# Patient Record
Sex: FEMALE | Race: WHITE | NOT HISPANIC OR LATINO | Employment: UNEMPLOYED | URBAN - METROPOLITAN AREA
[De-identification: names, ages, dates, MRNs, and addresses within clinical notes are randomized per-mention and may not be internally consistent; named-entity substitution may affect disease eponyms.]

---

## 2017-03-17 ENCOUNTER — HOSPITAL ENCOUNTER (OUTPATIENT)
Dept: RADIOLOGY | Facility: HOSPITAL | Age: 52
Discharge: HOME | End: 2017-03-17
Attending: NEUROLOGICAL SURGERY
Payer: COMMERCIAL

## 2017-03-17 DIAGNOSIS — M50.11 CERVICAL DISC DISORDER WITH RADICULOPATHY, HIGH CERVICAL REGION: ICD-10-CM

## 2017-03-17 DIAGNOSIS — M48.02 CERVICAL SPINAL STENOSIS: ICD-10-CM

## 2017-03-17 PROCEDURE — 72141 MRI NECK SPINE W/O DYE: CPT | Mod: TC,PO

## 2017-03-17 PROCEDURE — 72050 X-RAY EXAM NECK SPINE 4/5VWS: CPT | Mod: TC,PO

## 2017-04-13 ENCOUNTER — HOSPITAL ENCOUNTER (OUTPATIENT)
Dept: RADIOLOGY | Facility: HOSPITAL | Age: 52
Discharge: HOME | End: 2017-04-13
Attending: NEUROLOGICAL SURGERY
Payer: COMMERCIAL

## 2017-04-13 DIAGNOSIS — M50.11 CERVICAL DISC DISORDER WITH RADICULOPATHY OF OCCIPITO-ATLANTO-AXIAL REGION: ICD-10-CM

## 2017-04-13 DIAGNOSIS — M48.02 SPINAL STENOSIS IN CERVICAL REGION: ICD-10-CM

## 2017-04-13 PROCEDURE — 70551 MRI BRAIN STEM W/O DYE: CPT | Mod: TC,PO

## 2017-04-20 ENCOUNTER — HOSPITAL ENCOUNTER (EMERGENCY)
Facility: HOSPITAL | Age: 52
Discharge: HOME | End: 2017-04-20
Attending: EMERGENCY MEDICINE
Payer: COMMERCIAL

## 2017-04-20 VITALS
TEMPERATURE: 98 F | BODY MASS INDEX: 33.74 KG/M2 | WEIGHT: 150 LBS | OXYGEN SATURATION: 98 % | SYSTOLIC BLOOD PRESSURE: 111 MMHG | DIASTOLIC BLOOD PRESSURE: 72 MMHG | HEIGHT: 56 IN | RESPIRATION RATE: 16 BRPM | HEART RATE: 118 BPM

## 2017-04-20 DIAGNOSIS — M54.12 CERVICAL RADICULOPATHY: Primary | ICD-10-CM

## 2017-04-20 DIAGNOSIS — M54.2 NECK PAIN: ICD-10-CM

## 2017-04-20 PROCEDURE — 99283 EMERGENCY DEPT VISIT LOW MDM: CPT | Mod: 25

## 2017-04-20 PROCEDURE — 96372 THER/PROPH/DIAG INJ SC/IM: CPT

## 2017-04-20 PROCEDURE — 63600175 PHARM REV CODE 636 W HCPCS: Performed by: PHYSICIAN ASSISTANT

## 2017-04-20 PROCEDURE — 99284 EMERGENCY DEPT VISIT MOD MDM: CPT | Mod: ,,, | Performed by: EMERGENCY MEDICINE

## 2017-04-20 RX ORDER — TRIAMCINOLONE ACETONIDE 40 MG/ML
INJECTION, SUSPENSION INTRA-ARTICULAR; INTRAMUSCULAR
Status: DISPENSED
Start: 2017-04-20 | End: 2017-04-21

## 2017-04-20 RX ORDER — ORPHENADRINE CITRATE 30 MG/ML
30 INJECTION INTRAMUSCULAR; INTRAVENOUS
Status: COMPLETED | OUTPATIENT
Start: 2017-04-20 | End: 2017-04-20

## 2017-04-20 RX ORDER — TRIAMCINOLONE ACETONIDE 40 MG/ML
80 INJECTION, SUSPENSION INTRA-ARTICULAR; INTRAMUSCULAR
Status: COMPLETED | OUTPATIENT
Start: 2017-04-20 | End: 2017-04-20

## 2017-04-20 RX ORDER — ORPHENADRINE CITRATE 30 MG/ML
INJECTION INTRAMUSCULAR; INTRAVENOUS
Status: DISPENSED
Start: 2017-04-20 | End: 2017-04-21

## 2017-04-20 RX ADMIN — ORPHENADRINE CITRATE 30 MG: 30 INJECTION INTRAMUSCULAR; INTRAVENOUS at 03:04

## 2017-04-20 RX ADMIN — TRIAMCINOLONE ACETONIDE 80 MG: 40 INJECTION, SUSPENSION INTRA-ARTICULAR; INTRAMUSCULAR at 03:04

## 2017-04-20 NOTE — DISCHARGE INSTRUCTIONS
Continue to take your Neurontin as prescribed.  Continue take Percocet for pain as needed.  Follow-up with neurosurgery.  Return to the emergency department for any worsening symptoms.      Understanding Cervical Radiculopathy    Cervical radiculopathy is irritation or inflammation of a nerve root in the neck. It causes neck pain and other symptoms that may spread into the chest or down the arm. To understand this condition, it helps to understand the parts of the spine:  · Vertebrae. These are bones that stack to form the spine. The cervical spine contains the 7 vertebrae in the neck.  · Disks. These are soft pads of tissue between the vertebrae. They act as shock absorbers for the spine.  · The spinal canal. This is a tunnel formed within the stacked vertebrae. The spinal cord runs through this canal.  · Nerves. These branch off the spinal cord. As they leave the spinal canal, nerves pass through openings between the vertebrae. The nerve root is the part of the nerve that is closest to the spinal cord.   With cervical radiculopathy, nerve roots in the neck become irritated. This leads to pain and symptoms that can travel to the nerves that go from the spinal cord down the arms and into the torso.  What causes cervical radiculopathy?  Aging, injury, poor posture, and other issues can lead to problems in the neck. These problems may then irritate nerve roots. These include:  · Damage to a disk in the cervical spine. The damaged disk may then press on nearby nerve roots.  · Degeneration from wear and tear, and aging. This can lead to narrowing (stenosis) of the openings between the vertebrae. The narrowed openings press on nerve roots as they leave the spinal canal.  · An unstable spine. This is when a vertebra slips forward. It can then press on a nerve root.  There are other, less common causes of pressure on nerves in the neck. These include infection, cysts, and tumors.  Symptoms of cervical radiculopathy  These  include:  · Neck pain  · Pain, numbness, tingling, or weakness that travels down the arm  · Loss of neck movement  · Muscle spasms  Treatment for cervical radiculopathy  In most cases, your healthcare provider will first try treatments that help relieve symptoms. These may include:  · Prescription or over-the-counter pain medicines. These help relieve pain and swelling.  · Cold packs. These help reduce pain.  · Resting. This involves avoiding positions and activities that increase pain.  · Neck brace (cervical collar). This can help relieve inflammation and pain.  · Physical therapy, including exercises and stretches. This can help decrease pain and increase movement and function.  · Shots of medicinesaround the nerve roots. This is done to help relieve symptoms for a time.  In some cases, your healthcare provider may advise surgery to fix the underlying problem. This depends on the cause, the symptoms, and how long the pain has lasted.  Possible complications  Over time, an irritated and inflamed nerve may become damaged. This may lead to long-lasting (permanent) numbness or weakness. If symptoms change suddenly or get worse, be sure to let your healthcare provider know.     When to call your healthcare provider  Call your healthcare provider right away if you have any of these:  · New pain or pain that gets worse  · New or increasing weakness, numbness, or tingling in your arm or hand  · Bowel or bladder changes   Date Last Reviewed: 3/10/2016  © 4766-4610 [employer]. [address] [place] [address], [place] [address]. All rights reserved. This information is not intended as a substitute for professional medical care. Always follow your healthcare professional's instructions.

## 2017-04-20 NOTE — ED NOTES
"The patient came to the ER today with c/o bilateral hand "stickiness", joint stiffness, hurting with movement that began in February. Pt also states she was paralyzed yesterday. Cervical fusion in December with extensive history of cervical disc disorder and spinal stenosis. States she has seen her doctor and had several tests done, but states the doctor reports her hand complaints have nothing to do with her recent cervical fusion. States she was advised to see a rheumatologist.   "

## 2017-04-20 NOTE — ED NOTES
"State's " she feels rushed" Wanted to talk to the charge nurse. When informed she would have to wait--state's" I have a poor immune system and I can't wait" Left with relatives.   "

## 2017-04-20 NOTE — ED AVS SNAPSHOT
[facility]  [address] [facility] [address]               Karla Bravo   2017  2:02 PM   ED    Description:  Female : 1965   Department:  [facility]           Your Care was Coordinated By:     Provider Role From To    Radha Saldivar MD Attending Provider 17 1405 --    Amanda Jensen PA-C Physician Assistant 17 1402 --      Reason for Visit     Bilateral hand and arm numbness           Diagnoses this Visit        Comments    Cervical radiculopathy    -  Primary     Neck pain           ED Disposition     None           To Do List           Follow-up Information     Follow up with Meghana Mejia MD In 1 day.    Specialty:  Neurosurgery    Contact information:    [address]  [address]  [facility] [address]  [phone]          Follow up with [facility].    Specialty:  Emergency Medicine    Why:  If symptoms worsen    Contact information:    [address] [facility] [id number] [phone]      [facility]     [facility] -  Assistance  Unless otherwise directed by your provider, please contact [facility], our nurse care line that is available for  assistance.     Registered nurses in the [facility] provide: appointment scheduling, clinical advisement, health education, and other advisory services.  Call: [phone] (toll free)               Medications           Message regarding Medications     Verify the changes and/or additions to your medication regime listed below are the same as discussed with your clinician today.  If any of these changes or additions are incorrect, please notify your healthcare provider.        These medications were administered today        Dose Freq    triamcinolone acetonide injection 80 mg 80 mg ED 1 Time    Sig: Inject 2 mLs (80 mg total) into the muscle ED 1 Time.    Class: Normal    Route:  Intramuscular    Cosign for Ordering: Required by Radha Saldivar MD    orphenadrine injection 30 mg 30 mg ED 1 Time    Sig: Inject 1 mL (30 mg total) into the muscle ED 1 Time.    Class: Normal    Route: Intramuscular    Cosign for Ordering: Required by Radha Saldivar MD           Verify that the below list of medications is an accurate representation of the medications you are currently taking.  If none reported, the list may be blank. If incorrect, please contact your healthcare provider. Carry this list with you in case of emergency.           Current Medications     alprazolam (XANAX) 1 MG tablet Take 1 tablet by mouth 3 (three) times daily as needed for Anxiety.     chlorhexidine (PERIDEX) 0.12 % solution Use as directed 10 mLs in the mouth or throat 2 (two) times daily. Swish for 30 seconds and spit out. Do not swallow.    COCONUT OIL (COCONUT MISC) 1 tablet by Misc.(Non-Drug; Combo Route) route once daily.    cyclobenzaprine (FLEXERIL) 10 MG tablet Take 10 mg by mouth 2 (two) times daily.     desloratadine (CLARINEX) 5 mg tablet Take 5 mg by mouth nightly.     diphenoxylate-atropine 2.5-0.025 mg (LOMOTIL) 2.5-0.025 mg per tablet Take 1 tablet by mouth 4 (four) times daily as needed for Diarrhea.    lactobacillus acidophilus (ACIDOPHILUS) Cap Take 1 capsule by mouth once daily.    melatonin 5 mg Tab Take 2 tablets by mouth every evening.    milnacipran (SAVELLA) 100 mg Tab Take 1 tablet by mouth 2 (two) times daily.    mupirocin calcium 2% nasl oint (BACTROBAN) 2 % Oint 1 application by Nasal route 2 (two) times daily.    NASONEX 50 mcg/actuation nasal spray 2 sprays by Nasal route once daily.    omeprazole (PRILOSEC) 20 MG capsule Take 1 capsule (20 mg total) by mouth 2 (two) times daily.    ONABOTULINUMTOXINA (BOTOX INJ) Inject as directed.    ondansetron (ZOFRAN-ODT) 4 MG TbDL Take 1 tablet (4 mg total) by mouth every 8 (eight) hours as needed.    orphenadrine injection 30 mg Inject 1 mL (30 mg total)  "into the muscle ED 1 Time.    potassium chloride SA (K-DUR,KLOR-CON) 20 MEQ tablet Take 20 mEq by mouth once daily.     promethazine (PHENERGAN) 25 MG tablet Take 25 mg by mouth every 4 (four) hours as needed for Nausea.     pyridoxine (VITAMIN B-6) 50 MG Tab Take 50 mg by mouth once daily.    tramadol (ULTRAM) 50 mg tablet Take 50 mg by mouth every 6 (six) hours as needed for Pain.    triamcinolone acetonide injection 80 mg Inject 2 mLs (80 mg total) into the muscle ED 1 Time.    valerian root 450 mg Cap Take 1 tablet by mouth once daily.           Clinical Reference Information           Your Vitals Were     BP Pulse Temp Resp Height Weight    111/72 118 98.1 °F (36.7 °C) (Oral) 16 4' 8" (1.422 m) 68 kg (150 lb)    SpO2 BMI             98% 33.63 kg/m2         Allergies as of 4/20/2017        Reactions    Adhesive Hives, Rash    Antihistamines - Alkylamine Rash    hives    Demerol [Meperidine] Other (See Comments)    Weakness, lethargy    Levaquin [Levofloxacin] Hives    Macrobid [Nitrofurantoin Monohyd/m-cryst] Hives    Macrodantin [Nitrofurantoin Macrocrystalline]     Morphine Itching    Pill form    Penicillins Rash    Sulfa (Sulfonamide Antibiotics) Rash      Immunizations Administered on Date of Encounter - 4/20/2017     None      ED Micro, Lab, POCT     None      ED Imaging Orders     None        Discharge Instructions       Continue to take your Neurontin as prescribed.  Continue take Percocet for pain as needed.  Follow-up with neurosurgery.  Return to the emergency department for any worsening symptoms.      Understanding Cervical Radiculopathy    Cervical radiculopathy is irritation or inflammation of a nerve root in the neck. It causes neck pain and other symptoms that may spread into the chest or down the arm. To understand this condition, it helps to understand the parts of the spine:  · Vertebrae. These are bones that stack to form the spine. The cervical spine contains the 7 vertebrae in the " neck.  · Disks. These are soft pads of tissue between the vertebrae. They act as shock absorbers for the spine.  · The spinal canal. This is a tunnel formed within the stacked vertebrae. The spinal cord runs through this canal.  · Nerves. These branch off the spinal cord. As they leave the spinal canal, nerves pass through openings between the vertebrae. The nerve root is the part of the nerve that is closest to the spinal cord.   With cervical radiculopathy, nerve roots in the neck become irritated. This leads to pain and symptoms that can travel to the nerves that go from the spinal cord down the arms and into the torso.  What causes cervical radiculopathy?  Aging, injury, poor posture, and other issues can lead to problems in the neck. These problems may then irritate nerve roots. These include:  · Damage to a disk in the cervical spine. The damaged disk may then press on nearby nerve roots.  · Degeneration from wear and tear, and aging. This can lead to narrowing (stenosis) of the openings between the vertebrae. The narrowed openings press on nerve roots as they leave the spinal canal.  · An unstable spine. This is when a vertebra slips forward. It can then press on a nerve root.  There are other, less common causes of pressure on nerves in the neck. These include infection, cysts, and tumors.  Symptoms of cervical radiculopathy  These include:  · Neck pain  · Pain, numbness, tingling, or weakness that travels down the arm  · Loss of neck movement  · Muscle spasms  Treatment for cervical radiculopathy  In most cases, your healthcare provider will first try treatments that help relieve symptoms. These may include:  · Prescription or over-the-counter pain medicines. These help relieve pain and swelling.  · Cold packs. These help reduce pain.  · Resting. This involves avoiding positions and activities that increase pain.  · Neck brace (cervical collar). This can help relieve inflammation and pain.  · Physical  therapy, including exercises and stretches. This can help decrease pain and increase movement and function.  · Shots of medicinesaround the nerve roots. This is done to help relieve symptoms for a time.  In some cases, your healthcare provider may advise surgery to fix the underlying problem. This depends on the cause, the symptoms, and how long the pain has lasted.  Possible complications  Over time, an irritated and inflamed nerve may become damaged. This may lead to long-lasting (permanent) numbness or weakness. If symptoms change suddenly or get worse, be sure to let your healthcare provider know.     When to call your healthcare provider  Call your healthcare provider right away if you have any of these:  · New pain or pain that gets worse  · New or increasing weakness, numbness, or tingling in your arm or hand  · Bowel or bladder changes   Date Last Reviewed: 3/10/2016  © 2172-0428 [employer]. [address] [place] [address]. All rights reserved. This information is not intended as a substitute for professional medical care. Always follow your healthcare professional's instructions.    [facility] Sign-Up     Activating your [facility] account is as easy as 1-2-3!     1) Visit [email or web address], select Sign Up Now, enter this activation code and your date of birth, then select Next.  [email or web address]  Expires: 6/4/2017  2:51 PM      2) Create a username and password to use when you visit [facility] in the future and select a security question in case you lose your password and select Next.    3) Enter your e-mail address and click Sign Up!    Additional Information  If you have questions, please e-mail [email or web address] or call [phone] to talk to our [facility] staff. Remember, [facility] is NOT to be used for urgent needs. For medical emergencies, dial 911.          [facility] complies with applicable Federal civil rights laws and does not discriminate on the  basis of race, color, national origin, age, disability, or sex.        Language Assistance Services     ATTENTION: Language assistance services are available, free of charge. Please call [phone].      ATENCIÓN: Si habla ronnie, tiene a ackerman disposición servicios gratuitos de asistencia lingüística. Llame al [phone].     CHÚ Ý: N?u b?n nói Ti?ng Vi?t, có các d?ch v? h? tr? ngôn ng? mi?n phí dành cho b?n. G?i s? [phone].

## 2017-04-20 NOTE — ED PROVIDER NOTES
"Encounter Date: 4/20/2017    SCRIBE #1 NOTE: I, Dewayne Manuel, am scribing for, and in the presence of,  Dr. Saldivar. I have scribed the following portions of the note - the APC attestation.       History     Chief Complaint   Patient presents with    Bilateral hand and arm numbness     Pt reports - MRI. Pt reports history of fibromyalgia and states "Lst night my whole body went paralyzed. I was gonn come yesterday but it got to be too late."      Review of patient's allergies indicates:   Allergen Reactions    Adhesive Hives and Rash    Antihistamines - alkylamine Rash     hives    Demerol [meperidine] Other (See Comments)     Weakness, lethargy    Levaquin [levofloxacin] Hives    Macrobid [nitrofurantoin monohyd/m-cryst] Hives    Macrodantin [nitrofurantoin macrocrystalline]     Morphine Itching     Pill form    Penicillins Rash    Sulfa (sulfonamide antibiotics) Rash     HPI Comments: Patient who is a 51-year-old female with past medical history depression, anxiety, migraine, HTN, fibromyalgia presents to ED with neck pain and bilateral upper pain and paresthesias.  Patient states she has intermittent chronic neck pain.  She has a history of cervical fusion by neurosurgery here.  She states that yesterday, her neck pain flared up.  She denies any injury or trauma.  She reports onset of bilateral upper extremity pain and paresthesias today that she has had before in the past.  Patient takes Neurontin and Percocet for her symptoms as prescribed by her neurosurgeon.  She has no other complaint at this time.    The history is provided by the patient.     Past Medical History:   Diagnosis Date    Anxiety     Arthritis     Bronchitis     related to sinuses    Depression     Fibromyalgia     Hypertension     said takes BP med PRN    Kidney stones     right side    Migraine     Recurrent UTI     S/P Botox injection     12-8-16 for Migraines     Past Surgical History:   Procedure Laterality Date    " CERVICAL FUSION       SECTION  , ,     CYSTOSCOPY W/ URETERAL STENT PLACEMENT Right     10 times    LITHOTRIPSY Right 2012 to present    right side 9 times    NEPHRECTOMY Right 2014    Robotic assisted    pain pump  2007    removed 2009    TONSILLECTOMY  1989    URETER SURGERY Right 3/22/2013 & 2014    Robotic both surgeries     Family History   Problem Relation Age of Onset    Hyperlipidemia Mother     Diabetes Father     Heart disease Father     Hypertension Father     Kidney disease Father     Hyperlipidemia Brother      Social History   Substance Use Topics    Smoking status: Never Smoker    Smokeless tobacco: Never Used    Alcohol use No     Review of Systems   Constitutional: Negative for chills and fever.   HENT: Negative for sore throat.    Eyes: Negative for visual disturbance.   Respiratory: Negative for cough.    Gastrointestinal: Negative for abdominal pain and nausea.   Endocrine: Negative for polyuria.   Genitourinary: Negative for dysuria and hematuria.   Musculoskeletal: Positive for neck pain. Negative for back pain and neck stiffness.   Skin: Negative for pallor and rash.   Neurological: Positive for numbness. Negative for headaches.       Physical Exam   Initial Vitals   BP Pulse Resp Temp SpO2   17 1236 17 1236 17 1236 17 1236 17 1236   111/72 118 16 98.1 °F (36.7 °C) 98 %     Physical Exam    Vitals reviewed.  Constitutional: She appears well-developed and well-nourished. She is not diaphoretic. No distress.   HENT:   Head: Normocephalic and atraumatic.   Nose: Nose normal.   Eyes: Conjunctivae and EOM are normal.   Neck: Normal range of motion.   Cardiovascular: Normal rate, regular rhythm and normal heart sounds. Exam reveals no friction rub.    No murmur heard.  Pulmonary/Chest: Breath sounds normal. No respiratory distress. She has no wheezes. She has no rales.   Abdominal: Soft. Bowel sounds are normal. She exhibits no  distension. There is no tenderness. There is no rebound.   Musculoskeletal: Normal range of motion.        Back:    Cervical paraspinal TTP. No bony tenderness appreciated. FROM and strength of upper and lower extremities. No bony TTP appreciated of daryl UE. Sensations grossly intact. Skin without any abnormalities.    Neurological: She is alert and oriented to person, place, and time. She has normal strength. No sensory deficit.   Skin: Skin is warm and dry. No erythema. No pallor.   Psychiatric: She has a normal mood and affect. Thought content normal.         ED Course   Procedures  Labs Reviewed - No data to display          Medical Decision Making:   History:   Old Medical Records: I decided to obtain old medical records.       APC / Resident Notes:   Patient presents the ED with neck pain and bilateral upper extremity pain and paresthesias.  On exam, afebrile.  NAD and nontoxic appearing.  She is tearful when discussing history of her neck pain and surgery.  No spinal bony tenderness appreciated.  She has bilateral cervical paraspinal tenderness to palpation.  Bilateral upper extremity range of motion intact with good strength.  Sensations grossly intact.  2+ distal pulses.    I have considered but do not suspect fractures, dislocations, thoracic outlet syndrome, or meningitis.  Given her history and physical exam, her symptoms today most likely due to cervical radiculopathy with flare up of neck pain.  I will give her Norflex and steroid injection here.  She is to follow-up with neurosurgery as scheduled or earlier if needed.  Continue all other home medication which includes pain medication. Strict ED return precaution given.  All questions answered.  She is comfortable with plan and stable for discharge.  I reviewed patient's chart and discussed this case with my supervising FRANCIE Raza Attestation:   Ry #1: I performed the above scribed service and the documentation accurately describes the  services I performed. I attest to the accuracy of the note.    Attending Attestation:     Physician Attestation Statement for NP/PA:   I discussed this assessment and plan of this patient with the NP/PA, but I did not personally examine the patient. The face to face encounter was performed by the NP/PA.    Other NP/PA Attestation Additions:    History of Present Illness: Chronic neck pain.          Physician Attestation for Scribe:  Physician Attestation Statement for Scribe #1: I, Dr. Saldivar, reviewed documentation, as scribed by Dewayne Manuel in my presence, and it is both accurate and complete.                 ED Course     Clinical Impression:   The primary encounter diagnosis was Cervical radiculopathy. A diagnosis of Neck pain was also pertinent to this visit.    Disposition:   Disposition: Discharged  Condition: Stable       Amanda Jensen PA-C  04/21/17 2017

## 2017-04-21 ENCOUNTER — NURSE TRIAGE (OUTPATIENT)
Dept: ADMINISTRATIVE | Facility: CLINIC | Age: 52
End: 2017-04-21

## 2017-04-21 NOTE — TELEPHONE ENCOUNTER
Reason for Disposition   Requesting regular office appointment    Protocols used: ST INFORMATION ONLY CALL-A-AH    Requesting appointment with Neurology and Fibromyalgia MD.  Transferred to appointment desk

## 2017-05-02 ENCOUNTER — HOSPITAL ENCOUNTER (OUTPATIENT)
Dept: RADIOLOGY | Facility: HOSPITAL | Age: 52
Discharge: HOME | End: 2017-05-02
Attending: NEUROLOGICAL SURGERY
Payer: COMMERCIAL

## 2017-05-02 DIAGNOSIS — M50.11 CERVICAL DISC DISORDER WITH RADICULOPATHY OF OCCIPITO-ATLANTO-AXIAL REGION: ICD-10-CM

## 2017-05-02 PROCEDURE — 72125 CT NECK SPINE W/O DYE: CPT | Mod: TC,PO

## 2017-05-02 RX ORDER — GABAPENTIN 300 MG/1
CAPSULE ORAL
Refills: 2 | COMMUNITY
Start: 2017-04-25 | End: 2017-08-15

## 2017-05-25 RX ORDER — CEFDINIR 300 MG/1
CAPSULE ORAL
Refills: 0 | COMMUNITY
Start: 2017-05-19 | End: 2017-08-15

## 2017-05-25 RX ORDER — DESLORATADINE 5 MG/1
TABLET ORAL
Refills: 5 | COMMUNITY
Start: 2017-05-19 | End: 2017-09-26

## 2017-05-25 RX ORDER — DICYCLOMINE HYDROCHLORIDE 10 MG/1
CAPSULE ORAL
Refills: 4 | COMMUNITY
Start: 2017-03-08 | End: 2019-04-25 | Stop reason: CLARIF

## 2017-05-25 RX ORDER — FLUTICASONE PROPIONATE 50 MCG
SPRAY, SUSPENSION (ML) NASAL
Refills: 4 | COMMUNITY
Start: 2017-04-14

## 2017-05-25 RX ORDER — CYCLOBENZAPRINE HCL 10 MG
10 TABLET ORAL 2 TIMES DAILY
Refills: 1 | COMMUNITY
Start: 2017-05-06 | End: 2019-04-25 | Stop reason: CLARIF

## 2017-05-25 RX ORDER — ALPRAZOLAM 1 MG/1
1 TABLET ORAL 3 TIMES DAILY PRN
Refills: 2 | COMMUNITY
Start: 2017-05-20 | End: 2019-03-12

## 2017-05-30 DIAGNOSIS — M50.11 CERVICAL DISC DISORDER WITH RADICULOPATHY, HIGH CERVICAL REGION: Primary | ICD-10-CM

## 2017-05-31 DIAGNOSIS — M50.11 CERVICAL DISC DISORDER WITH RADICULOPATHY, HIGH CERVICAL REGION: Primary | ICD-10-CM

## 2017-06-26 ENCOUNTER — HOSPITAL ENCOUNTER (OUTPATIENT)
Dept: RADIOLOGY | Facility: HOSPITAL | Age: 52
Discharge: HOME | End: 2017-06-26
Attending: NEUROLOGICAL SURGERY
Payer: COMMERCIAL

## 2017-06-26 DIAGNOSIS — M50.11 CERVICAL DISC DISORDER WITH RADICULOPATHY, HIGH CERVICAL REGION: ICD-10-CM

## 2017-06-26 PROCEDURE — 72050 X-RAY EXAM NECK SPINE 4/5VWS: CPT | Mod: TC

## 2017-06-26 PROCEDURE — 72050 X-RAY EXAM NECK SPINE 4/5VWS: CPT | Mod: 26,,, | Performed by: RADIOLOGY

## 2017-06-27 ENCOUNTER — TELEPHONE (OUTPATIENT)
Dept: NEUROSURGERY | Facility: CLINIC | Age: 52
End: 2017-06-27

## 2017-07-05 ENCOUNTER — OFFICE VISIT (OUTPATIENT)
Dept: NEUROSURGERY | Facility: CLINIC | Age: 52
End: 2017-07-05
Payer: COMMERCIAL

## 2017-07-05 VITALS
SYSTOLIC BLOOD PRESSURE: 157 MMHG | BODY MASS INDEX: 35.11 KG/M2 | HEART RATE: 113 BPM | WEIGHT: 156.63 LBS | DIASTOLIC BLOOD PRESSURE: 93 MMHG

## 2017-07-05 DIAGNOSIS — G89.29 CHRONIC NECK PAIN: Primary | ICD-10-CM

## 2017-07-05 DIAGNOSIS — Z98.1 HISTORY OF FUSION OF CERVICAL SPINE: ICD-10-CM

## 2017-07-05 DIAGNOSIS — M54.2 CHRONIC NECK PAIN: Primary | ICD-10-CM

## 2017-07-05 PROCEDURE — 99999 PR PBB SHADOW E&M-EST. PATIENT-LVL III: CPT | Mod: PBBFAC,,, | Performed by: PHYSICIAN ASSISTANT

## 2017-07-05 PROCEDURE — 99204 OFFICE O/P NEW MOD 45 MIN: CPT | Mod: S$GLB,,, | Performed by: PHYSICIAN ASSISTANT

## 2017-07-05 NOTE — PROGRESS NOTES
"[facility] - Neurosurgery  Clinic Consult     Consult Requested By: Self Referral     Reason for Consult:       SUBJECTIVE:     Chief Complaint: posterior neck pain     History of Present Illness:  Karla Bravo is a 51 y.o. female with fibromyalgia and PSH of C4-7 anterior fusion by Dr. Dickinson, neurosurgery in [place], [place]. She states she is still established with him and was last seen in May 2017. She is also established with Dr. Rivas in pain management. She presents to clinic today complaining of chronic neck pain. She states the pain is located in the posterior aspect of her neck and radiates into the posterior aspect of bilateral shoulders, down her upper back, and into the occipital region of her head. This pain is associated with headaches in the occipital region. She states Dr. Dickinson recommended she be evaluated by Neurology and Rheumatology. She states he has informed her there are "2 bones moving" in her neck. He also recommended facet injections and possible RFA to "decompress the area causing pain." She also reports she has experienced episodes of "whole body paralysis" where she could not move anything below her neck. This has occurred 3-4 times. She reports frequent episodes of paralysis from her elbows to her fingers bilaterally. She denies pain radiating down her upper extremities. She denies dropping objects or difficulty with fine motor function. She denies gait instability. She reports her pain limits her ADLs. She has not tried physical therapy and states she cannot participate in PT. She takes gabapentin and narcotic pain medication.        Neck Disability  Neck Disability-Pain Score: 7  Neck Disability-Pain Intensity: The pain is very severe at the moment  Neck Disability-Personal Care: I need help every day in most aspects of self care  Neck Disability-Lifting: I cannot lift or carry anything at all  Neck Disability-Reading: I can hardly read at all, because of severe pain in my " neck  Neck Disability-Headaches: I have headaches almost all of the time  Neck Disability-Concentration: I have a great deal of difficulty in concentrating when I want to  Neck Disability-work: I cannot do any work at all  Neck Disability-Driving: I can hardly drive at all because of severe pain in my neck  Neck Disability-Sleeping: My sleep is completely disturbed (5-7 hours sleeplessness)  Neck Disability-Recreation: I cant do any recreation activities at all        Review of patient's allergies indicates:   Allergen Reactions    Adhesive Hives and Rash    Antihistamines - alkylamine Rash     hives    Demerol [meperidine] Other (See Comments)     Weakness, lethargy    Levaquin [levofloxacin] Hives    Macrobid [nitrofurantoin monohyd/m-cryst] Hives    Macrodantin [nitrofurantoin macrocrystalline]     Morphine Itching     Pill form    Penicillins Rash    Sulfa (sulfonamide antibiotics) Rash       Past Medical History:   Diagnosis Date    Anxiety     Arthritis     Bronchitis     related to sinuses    Depression     Fibromyalgia     Hypertension     said takes BP med PRN    Kidney stones     right side    Migraine     Recurrent UTI     S/P Botox injection     16 for Migraines     Past Surgical History:   Procedure Laterality Date    CERVICAL FUSION       SECTION  , ,     CYSTOSCOPY W/ URETERAL STENT PLACEMENT Right     10 times    LITHOTRIPSY Right 2012 to present    right side 9 times    NEPHRECTOMY Right 2014    Robotic assisted    pain pump  2007    removed 2009    TONSILLECTOMY  1989    URETER SURGERY Right 3/22/2013 & 2014    Robotic both surgeries     Family History   Problem Relation Age of Onset    Hyperlipidemia Mother     Diabetes Father     Heart disease Father     Hypertension Father     Kidney disease Father     Hyperlipidemia Brother      Social History   Substance Use Topics    Smoking status: Never Smoker    Smokeless tobacco: Never Used  "   Alcohol use No        Review of Systems:    Constitutional: no fever, chills or night sweats. No changes in weight   Eyes: no visual changes   ENT: no nasal congestion or sore throat   Respiratory: no cough or shortness of breath   Cardiovascular: no chest pain or palpitations   Gastrointestinal: no nausea or vomiting. Constipation secondary to opioid use   Genitourinary: no hematuria or dysuria   Integument/Breast: no rash or pruritis   Hematologic/Lymphatic: no easy bruising or lymphadenopathy   Musculoskeletal: neck pain    Neurological: no seizures or tremors. Episodes of "whole body paralysis" and "paralysis from elbows to fingers"   Behavioral/Psych: no auditory or visual hallucinations       OBJECTIVE:     Vital Signs (Most Recent):  Vitals:    07/05/17 1337   BP: (!) 157/93   Pulse: (!) 113       Physical Exam:  General: well developed, well nourished, no distress.   Neurologic: Alert and oriented. Thought content appropriate.  GCS: Motor: 6/Verbal: 5/Eyes: 4 GCS Total: 15  Mental Status: Awake, Alert, Oriented x 4  Head: normocephalic, atraumatic  Eyes: EOMI.  Neck: trachea midline, no JVD   Cardiovascular: no LE edema  Pulmonary: normal respirations, no signs of respiratory distress  Abdomen: soft, non-distended  Sensory: intact to light touch throughout  Skin: Skin is warm, dry and intact.    Motor Strength: Moves all extremities spontaneously with good tone. No abnormal movements seen.     Strength  Deltoids Triceps Biceps Wrist Extension Wrist Flexion Hand  Interossei   Upper: R 4+/5 4+/5 4+/5 5/5 5/5 5/5 5/5    L 4+/5 4+/5 4+/5 5/5 5/5 5/5 5/5     Iliopsoas Quadriceps Knee  Flexion Tibialis  anterior Gastro- cnemius EHL    Lower: R 5/5 5/5 5/5 5/5 5/5 5/5     L 5/5 5/5 5/5 5/5 5/5 5/5      DTR's: 2 + and symmetric in UE and LE  Tyson: absent  Clonus: absent    Gait: normal    Tandem Gait: No difficulty           Able to walk on heels & toes    Cervical Spine: decreased ROM secondary to " fusion. TTP paraspinal musculature bilaterally, midline at C7, bilateral trapezius muscles       Diagnostic Results:  I have independently reviewed the following imaging and agree with findings    Xray Cervical Spine 6/26/17:  Findings:5 views obtained. Prior C4-5-6-7 cervical fusion with plate and screws and interbody disc spacers.  No subluxation with extension and flexion views.  C7-T1 facet arthropathy.  No prevertebral soft tissue thickening.  The dens is unremarkable.  No hardware failure or loosening.   No marrow replacement process.    CT cervical spine 5/2/17:  1. There is anterior spinal fusion aware in place between C4 and C7.   2. There is straightening of the normal cervical lordosis.   3. There is deformity of the right condyle of the mandible.  4. There is a mild amount of atherosclerosis.     MRI Cervical Spine 3/17/17:   There are postoperative changes present involving the cervical vertebra.  Anterior cervical discectomy and fusion is present with anterior plate and screws at the C4-C5 and C5-C6 disc levels.  Also evidence of anterior fusion with discectomy with interspacing screws at the C6-C7 level.    C2-C3:Unremarkable    C3-C4: Minor disc bulge.  Moderate left and mild right facet arthritis.    C4-C5:Anterior cervical fusion.    C5-C6:Anterior cervical fusion.    C6-C7:Anterior cervical fusion with vertebral body screws.  Mild residual disc bulge and osteophyte with mild ventral sac impression.    C7-T1: Unremarkable.  Possible left lateral mass vertebral hemangioma.     Impression:      Postoperative changes consistent with anterior cervical fusion at the C4-C5, C5-C6 and C6-C7 levels with residual disc bulge and osteophyte resulting in minor ventral sac impression at the C6-C7 level.    Otherwise minor C3-C4 disc bulge with right facet arthritis.         ASSESSMENT/PLAN:     52 yo female s/p C4-7 anterior fusion with chronic neck pain.    -Referral to Dr. Duff for possible cervical spinal  cord stimulator trial  -Discussed with Dr. Bean     Spent greater than 60 minutes with patient    Shayla Mai PA-C  Neurosurgery

## 2017-07-11 ENCOUNTER — TELEPHONE (OUTPATIENT)
Dept: NEUROSURGERY | Facility: CLINIC | Age: 52
End: 2017-07-11

## 2017-07-11 NOTE — TELEPHONE ENCOUNTER
Contacted pt to schedule pt for a consultation with Dr. Duff, pt states she does not wish to schedule at this time and would like to explore her other options.   Advised pt to give us a call back when she is ready to schedule.

## 2017-08-15 ENCOUNTER — TELEPHONE (OUTPATIENT)
Dept: NEUROLOGY | Facility: CLINIC | Age: 52
End: 2017-08-15

## 2017-08-15 ENCOUNTER — OFFICE VISIT (OUTPATIENT)
Dept: NEUROLOGY | Facility: CLINIC | Age: 52
End: 2017-08-15
Payer: COMMERCIAL

## 2017-08-15 VITALS
BODY MASS INDEX: 36.46 KG/M2 | HEIGHT: 56 IN | DIASTOLIC BLOOD PRESSURE: 80 MMHG | WEIGHT: 162.06 LBS | HEART RATE: 72 BPM | SYSTOLIC BLOOD PRESSURE: 140 MMHG

## 2017-08-15 DIAGNOSIS — G47.30 UNSPECIFIED SLEEP APNEA: ICD-10-CM

## 2017-08-15 DIAGNOSIS — G44.86 CERVICOGENIC HEADACHE: Primary | ICD-10-CM

## 2017-08-15 DIAGNOSIS — G43.719 CHRONIC MIGRAINE WITHOUT AURA, WITH INTRACTABLE MIGRAINE, SO STATED, WITHOUT MENTION OF STATUS MIGRAINOSUS: ICD-10-CM

## 2017-08-15 DIAGNOSIS — M54.81 OCCIPITAL NEURALGIA, UNSPECIFIED LATERALITY: ICD-10-CM

## 2017-08-15 PROCEDURE — 3079F DIAST BP 80-89 MM HG: CPT | Mod: S$GLB,,, | Performed by: NURSE PRACTITIONER

## 2017-08-15 PROCEDURE — 99999 PR PBB SHADOW E&M-EST. PATIENT-LVL III: CPT | Mod: PBBFAC,,, | Performed by: NURSE PRACTITIONER

## 2017-08-15 PROCEDURE — 99204 OFFICE O/P NEW MOD 45 MIN: CPT | Mod: S$GLB,,, | Performed by: NURSE PRACTITIONER

## 2017-08-15 PROCEDURE — 3008F BODY MASS INDEX DOCD: CPT | Mod: S$GLB,,, | Performed by: NURSE PRACTITIONER

## 2017-08-15 PROCEDURE — 3077F SYST BP >= 140 MM HG: CPT | Mod: S$GLB,,, | Performed by: NURSE PRACTITIONER

## 2017-08-15 RX ORDER — ONDANSETRON 8 MG/1
8 TABLET, ORALLY DISINTEGRATING ORAL EVERY 12 HOURS PRN
Qty: 20 TABLET | Refills: 3 | Status: SHIPPED | OUTPATIENT
Start: 2017-08-15 | End: 2017-08-15 | Stop reason: SDUPTHER

## 2017-08-15 RX ORDER — RIZATRIPTAN BENZOATE 10 MG/1
10 TABLET ORAL
Qty: 12 TABLET | Refills: 3 | Status: SHIPPED | OUTPATIENT
Start: 2017-08-15 | End: 2017-09-14 | Stop reason: ALTCHOICE

## 2017-08-15 RX ORDER — HYDROCODONE BITARTRATE AND ACETAMINOPHEN 10; 325 MG/1; MG/1
1 TABLET ORAL EVERY 6 HOURS PRN
COMMUNITY
End: 2019-04-25 | Stop reason: CLARIF

## 2017-08-15 RX ORDER — RIZATRIPTAN BENZOATE 10 MG/1
10 TABLET ORAL
Qty: 12 TABLET | Refills: 3 | Status: SHIPPED | OUTPATIENT
Start: 2017-08-15 | End: 2017-08-15 | Stop reason: SDUPTHER

## 2017-08-15 RX ORDER — ONDANSETRON 8 MG/1
8 TABLET, ORALLY DISINTEGRATING ORAL EVERY 12 HOURS PRN
Qty: 20 TABLET | Refills: 3 | Status: SHIPPED | OUTPATIENT
Start: 2017-08-15 | End: 2018-07-02 | Stop reason: SDUPTHER

## 2017-08-15 NOTE — PROGRESS NOTES
"REFERRING PROVIDER: Self referred    REASON FOR CONSULT: Headaches    About to get second opinion for neck pain, see new pain mgt specialist. 2-mos after 2nd cervical fusion she was having intermittent paralysis elbow to hands and waist down. Headaches since 17 yrs. Last 6-yrs they are horrible, daily and last for days. Describes HA as starting left posterior/occipital spike bilateral and shoots down to shoulders. Also gets occasional frontal headache "for 5yrs couldn't work, was in bed all of the time". Uses compound pain cream qid. Pain associated with photophobia. Rests in dark quiet room. +nausea and vomiting. Known triggers include chocolate, stress. "I do have cluster, tension, and migraine headaches". Have nerve damage on R side>L side. Wants to get Iovera with Dr. Colon. Stopped taking Gabapentin 100mg tid, just recently due to abd swelling/wgt gain.   Takes a lot of supplements.     Rev'd note LOLA Mai 7/5/17:   Karla Bravo is a 51 y.o. female with fibromyalgia and PSH of C4-7 anterior fusion by Dr. Dickinson, neurosurgery in [place], [place]. She states she is still established with him and was last seen in May 2017. She is also established with Dr. Rivas in pain management. She presents to clinic today complaining of chronic neck pain. She states the pain is located in the posterior aspect of her neck and radiates into the posterior aspect of bilateral shoulders, down her upper back, and into the occipital region of her head. This pain is associated with headaches in the occipital region. She states Dr. Dickinson recommended she be evaluated by Neurology and Rheumatology. She states he has informed her there are "2 bones moving" in her neck. He also recommended facet injections and possible RFA to "decompress the area causing pain." She also reports she has experienced episodes of "whole body paralysis" where she could not move anything below her neck. This has occurred 3-4 times. She reports frequent " "episodes of paralysis from her elbows to her fingers bilaterally. She denies pain radiating down her upper extremities. She denies dropping objects or difficulty with fine motor function. She denies gait instability. She reports her pain limits her ADLs. She has not tried physical therapy and states she cannot participate in PT. She takes gabapentin and narcotic pain medication.    Tried: Imitrex, maxalt ? Ineffective, topamax 50mg bid stopped 2017, cymbalta (myalgia), amitriptyline years ago. Botox once day before her neck surgery '16. ?Inderal    FH: +headaches  SH: , no tobacco or etoh, disabled.     HIT-6 today= 78  Hx right nephrectomy 2014 due to renal stone  Hx pain pump, removed      ROS:+ double vision "at nighttime", +blurred vision (ophthalmologist says she needs bifocals, +astigmatism), neck pain, intermittent snoring (better since SMTR), occasional air gasping "think I have sleep apnea like my dad"    PHYSICAL EXAM:   General: W/D, obese, well groomed  BP (!) 140/80   Pulse 72   Ht 4' 8" (1.422 m)   Wt 73.5 kg (162 lb 0.6 oz)   LMP 11/30/2012   BMI 36.33 kg/m²   Neurological: Awake, alert, oriented x 3. Speech fluent, no dysarthria. Good attention span. Good fund of knowledge.   CN II-XII- pupils equal, no ptosis, nystagmus, EOM palsy. No facial asymmetry or facial sensory loss. Good hearing bilaterally. Good shoulder shrug, palatal elevation, tongue protrusion bilaterally.   Motor: 5/5 strength, normal tone/bulk in all extremities. Limited ROM neck R>L  Sensory: Intact to light touch upper and lower extremities. +occipital and paracervical muscle tenderness.   Gait: Normal   Coordination: Good FTNT, Heel to shin   Neck circumference 14", coated broad short tongue, low lying palate, nasal airflow normal, post pharynx erythematous/dry    IMPRESSION:   Chronic migraines w/o aura, intractable  Cervicogenic headache, occpitial neuralgia  Cervical DDD s/p 2 fusions  Unspecified sleep " apnea  Fibromyalgia  Anxiety  Obesity    PLAN   Request Botox authorization, plan q3mos. SHE HAS OWN Med already  See Dr. Colon (nerve block, ongoing mgt)  Continue Savella for myalgia (been on ~ 6-mos)(PCP)    Abortive therapy:   Zofran ODT 8mg q12h prn nausea and/or headache  Trial Maxalt 10mg MARY q2h prn headache  Encouraged to eat regular meals, get adequate sleep, avoid known triggers, and reduce stressors.     PSG: Discussed etiology of ALTHEA and potential ramifications of untreated ALTHEA, including heart disease, hypertension, cognitive difficulties, stroke, and diabetes.

## 2017-08-15 NOTE — PATIENT INSTRUCTIONS
Obstructive Sleep Apnea  Obstructive sleep apnea is a condition that causes your air passages to become narrowed or blocked during sleep. As a result, breathing stops for short periods. Your body wakes up enough for breathing to begin again, though you don't remember it. The cycle of stopped breathing and brief awakenings can repeat dozens of times a night. This prevents the body from getting to the deeper stages of sleep that are needed for good rest.  Signs of sleep apnea include loud snoring, noisy breathing, and gasping sounds during sleep. Daytime symptoms include waking up tired after a full night's sleep, waking up with headaches, feeling very sleepy or falling asleep during the day, and having problems with memory or concentration.  Risk factors for sleep apnea include:  · Being overweight  · Being a man, or a woman in menopause  · Smoking  · Using alcohol or sedating medications or herbs  · Having enlarged structures in the nose or throat  Home care  Lifestyle changes that can help treat snoring and sleep apnea include the following:  · If you are overweight, lose weight. Talk to your healthcare provider about a weight-loss plan for you.  · Avoid alcohol for 3 to 4 hours before bedtime. Avoid sedating medications. Ask your healthcare provider about the medications you take.  · If you smoke, talk to your healthcare provider about ways to quit.  · Sleep on your side. This can help prevent gravity from pulling relaxed throat tissues into your breathing passages.  · If you have allergies or sinus problems that block your nose, ask your healthcare provider for help.  Follow up  Follow up with your healthcare provider as advised. A diagnosis of sleep apnea is made with a sleep study. Your healthcare provider can tell you more about this test.  When to seek medical care  Sleep apnea can make you more likely to have certain health problems. These include high blood pressure, heart attack, stroke, and sexual  dysfunction. If you have sleep apnea, talk to your healthcare provider about the best treatments for you.  Date Last Reviewed: 5/3/2015  © 8424-7910 [employer]. [address] [place] [address], [place] [address]. All rights reserved. This information is not intended as a substitute for professional medical care. Always follow your healthcare professional's instructions.      Karla or Joshua will contact you to schedule your sleep study. Their number is [phone] (ext 2). The [facility] is located on 7th floor of the [facility].    We will call you when the sleep study results are ready - if you have not heard from us by 2 weeks from the date of the study, please call [phone] (ext 1).    You are advised to abstain from driving should you feel sleepy or drowsy.

## 2017-08-16 ENCOUNTER — TELEPHONE (OUTPATIENT)
Dept: NEUROLOGY | Facility: CLINIC | Age: 52
End: 2017-08-16

## 2017-08-16 NOTE — TELEPHONE ENCOUNTER
"Called to schedule nerve block with Dr. Colon ordered by PRIMITIVO Robb.    Unable to reach or leave message- "mailbox full"      Scheduled for first available and put letter in the mail with information  "

## 2017-08-25 ENCOUNTER — TELEPHONE (OUTPATIENT)
Dept: SLEEP MEDICINE | Facility: OTHER | Age: 52
End: 2017-08-25

## 2017-09-14 ENCOUNTER — HOSPITAL ENCOUNTER (OUTPATIENT)
Dept: RADIOLOGY | Facility: HOSPITAL | Age: 52
Discharge: HOME | End: 2017-09-14
Attending: NURSE PRACTITIONER
Payer: COMMERCIAL

## 2017-09-14 ENCOUNTER — INITIAL CONSULT (OUTPATIENT)
Dept: GASTROENTEROLOGY | Facility: CLINIC | Age: 52
End: 2017-09-14
Payer: COMMERCIAL

## 2017-09-14 VITALS
HEIGHT: 56 IN | WEIGHT: 155.63 LBS | SYSTOLIC BLOOD PRESSURE: 140 MMHG | BODY MASS INDEX: 35.01 KG/M2 | DIASTOLIC BLOOD PRESSURE: 90 MMHG | HEART RATE: 100 BPM

## 2017-09-14 DIAGNOSIS — R10.84 GENERALIZED ABDOMINAL PAIN: Primary | ICD-10-CM

## 2017-09-14 DIAGNOSIS — R10.84 GENERALIZED ABDOMINAL PAIN: ICD-10-CM

## 2017-09-14 DIAGNOSIS — K21.9 GASTROESOPHAGEAL REFLUX DISEASE, ESOPHAGITIS PRESENCE NOT SPECIFIED: ICD-10-CM

## 2017-09-14 PROCEDURE — 3008F BODY MASS INDEX DOCD: CPT | Mod: S$GLB,,, | Performed by: NURSE PRACTITIONER

## 2017-09-14 PROCEDURE — 74020 XR ABDOMEN FLAT AND ERECT: CPT | Mod: TC,PO

## 2017-09-14 PROCEDURE — 3077F SYST BP >= 140 MM HG: CPT | Mod: S$GLB,,, | Performed by: NURSE PRACTITIONER

## 2017-09-14 PROCEDURE — 74020 XR ABDOMEN FLAT AND ERECT: CPT | Mod: 26,,, | Performed by: RADIOLOGY

## 2017-09-14 PROCEDURE — 3080F DIAST BP >= 90 MM HG: CPT | Mod: S$GLB,,, | Performed by: NURSE PRACTITIONER

## 2017-09-14 PROCEDURE — 99204 OFFICE O/P NEW MOD 45 MIN: CPT | Mod: S$GLB,,, | Performed by: NURSE PRACTITIONER

## 2017-09-14 PROCEDURE — 99999 PR PBB SHADOW E&M-EST. PATIENT-LVL IV: CPT | Mod: PBBFAC,,, | Performed by: NURSE PRACTITIONER

## 2017-09-14 RX ORDER — BUTALBITAL, ACETAMINOPHEN AND CAFFEINE 50; 325; 40 MG/1; MG/1; MG/1
1 TABLET ORAL EVERY 6 HOURS PRN
COMMUNITY
Start: 2017-08-10

## 2017-09-14 RX ORDER — DOXYCYCLINE 100 MG/1
100 CAPSULE ORAL 2 TIMES DAILY
COMMUNITY
Start: 2017-09-12 | End: 2017-09-22

## 2017-09-14 RX ORDER — TOPIRAMATE 50 MG/1
1 TABLET, FILM COATED ORAL DAILY
COMMUNITY
Start: 2017-09-08 | End: 2017-09-26 | Stop reason: ALTCHOICE

## 2017-09-14 RX ORDER — CODEINE PHOSPHATE AND GUAIFENESIN 10; 100 MG/5ML; MG/5ML
5 SOLUTION ORAL 4 TIMES DAILY
COMMUNITY
Start: 2017-09-12 | End: 2017-09-22

## 2017-09-15 ENCOUNTER — TELEPHONE (OUTPATIENT)
Dept: GASTROENTEROLOGY | Facility: CLINIC | Age: 52
End: 2017-09-15

## 2017-09-15 NOTE — PROGRESS NOTES
Clinic Consult:  [facility] Consultation Note    Reason for Consult:  The primary encounter diagnosis was Generalized abdominal pain. A diagnosis of Gastroesophageal reflux disease, esophagitis presence not specified was also pertinent to this visit.    PCP: Merritt Ruano       HPI:  This is a 52 y.o. female here for evaluation of the above  She states that over the last few years, she has had multiple medical problems which have ultimately led to daily narcotic use.  She states that since that time, she has been having progressively worsening abdominal pain with bloating, nausea, dyspepsia and constipation.  About one year ago, she was seen by GI at [facility] and EGD and colonoscopy were completed.  She was diagnosed with GERD at that time.  She was on a PPI but stopped it due to concerns for worsening kidney disease (reports currently in a law suit due to PPI use). Since then, her symptoms have progressively worsened.  She is having daily abdominal distention with bloating.  She reports constipation that is uncontrolled.  Was recently seen by IM and given samples of Relistor.  She states that with that, she is having loose stools multiple times per day.  The bloating and nausea have no significantly improved with that.   She denies any melena or hematochezia.  No weight loss associated.  No loss of appetite.         Review of Systems   Constitutional: Negative for chills, fever, malaise/fatigue and weight loss.   Respiratory: Negative for cough.    Cardiovascular: Negative for chest pain.   Gastrointestinal:        Per HPI   Musculoskeletal: Negative for myalgias.   Skin: Negative for itching and rash.   Neurological: Negative for headaches.   Psychiatric/Behavioral: The patient is not nervous/anxious.        Medical History:   Past Medical History:   Diagnosis Date    Anxiety     Arthritis     Bronchitis     related to sinuses    Depression     Fibromyalgia     Hypertension     said  takes BP med PRN    Kidney stones     right side    Migraine     Recurrent UTI     S/P Botox injection     16 for Migraines       Surgical History:  Past Surgical History:   Procedure Laterality Date    CERVICAL FUSION       SECTION  , ,     CYSTOSCOPY W/ URETERAL STENT PLACEMENT Right     10 times    LITHOTRIPSY Right 2012 to present    right side 9 times    NEPHRECTOMY Right 2014    Robotic assisted    pain pump  2007    removed 2009    TONSILLECTOMY  1989    URETER SURGERY Right 3/22/2013 & 2014    Robotic both surgeries       Family History:   Family History   Problem Relation Age of Onset    Hyperlipidemia Mother     Diabetes Father     Heart disease Father     Hypertension Father     Kidney disease Father     Hyperlipidemia Brother        Social History:   Social History   Substance Use Topics    Smoking status: Never Smoker    Smokeless tobacco: Never Used    Alcohol use No       Allergies: Reviewed    Home Medications:   Current Outpatient Prescriptions on File Prior to Visit   Medication Sig Dispense Refill    alprazolam (XANAX) 1 MG tablet Take 1 mg by mouth 3 (three) times daily as needed for Anxiety.   2    COCONUT OIL (COCONUT MISC) 1 tablet by Misc.(Non-Drug; Combo Route) route once daily.      cyclobenzaprine (FLEXERIL) 10 MG tablet Take 10 mg by mouth 2 (two) times daily.   1    dicyclomine (BENTYL) 10 MG capsule   4    hydrocodone-acetaminophen 10-325mg (NORCO)  mg Tab Take 1 tablet by mouth every 6 (six) hours as needed.       lactobacillus acidophilus (ACIDOPHILUS) Cap Take 1 capsule by mouth once daily.      ondansetron (ZOFRAN-ODT) 8 MG TbDL Take 1 tablet (8 mg total) by mouth every 12 (twelve) hours as needed. 20 tablet 3    desloratadine (CLARINEX) 5 mg tablet   5    fluticasone (FLONASE) 50 mcg/actuation nasal spray   4     No current facility-administered medications on file prior to visit.        Physical Exam:  Vital  "Signs:  BP (!) 140/90   Pulse 100   Ht 4' 8" (1.422 m)   Wt 70.6 kg (155 lb 10.3 oz)   LMP 11/30/2012   BMI 34.89 kg/m²   Body mass index is 34.89 kg/m².  Physical Exam   Constitutional: She is oriented to person, place, and time. She appears well-developed and well-nourished.   HENT:   Head: Normocephalic.   Eyes: No scleral icterus.   Neck: Normal range of motion.   Cardiovascular: Normal rate and regular rhythm.    Pulmonary/Chest: Effort normal and breath sounds normal.   Abdominal: Soft. Bowel sounds are normal. She exhibits distension. There is tenderness (generalized).   Musculoskeletal: Normal range of motion.   Neurological: She is alert and oriented to person, place, and time.   Skin: Skin is warm and dry.   Psychiatric: She has a normal mood and affect.   Vitals reviewed.      Labs: Pertinent labs reviewed.      Assessment:  1. Generalized abdominal pain    2. Gastroesophageal reflux disease, esophagitis presence not specified         Recommendations:  - I suspect that her symptoms are related to worsening OIC and chronic gastritis that is not currently being treated.   - Will get an x-ray today to R/O obstruction  - Pt agreed to BID H2 blocker.   - Can continue Relistor as prescribed by previous provider.  Generalized abdominal pain  -     X-Ray Abdomen Flat And Erect; Future; Expected date: 09/14/2017  -     ranitidine (ZANTAC) 150 MG capsule; Take 1 capsule (150 mg total) by mouth 2 (two) times daily.  Dispense: 60 capsule; Refill: 11    Gastroesophageal reflux disease, esophagitis presence not specified  -     ranitidine (ZANTAC) 150 MG capsule; Take 1 capsule (150 mg total) by mouth 2 (two) times daily.  Dispense: 60 capsule; Refill: 11        Return in about 6 weeks (around 10/26/2017).        Thank you so much for allowing me to participate in the care of Karla Bravo    WILL Coronado  "

## 2017-09-15 NOTE — TELEPHONE ENCOUNTER
----- Message from Susana Montana sent at 9/15/2017  3:13 PM CDT -----  Contact: Patient  Patient called and stated she is looking for her x-ray results. She stated she is still in a lot of pain and not having a bowel movement. She wants to know what she should do.    She can be contacted at [phone].    Thanks,  Susana

## 2017-09-16 ENCOUNTER — NURSE TRIAGE (OUTPATIENT)
Dept: ADMINISTRATIVE | Facility: CLINIC | Age: 52
End: 2017-09-16

## 2017-09-17 NOTE — TELEPHONE ENCOUNTER
"    Reason for Disposition   [1] SEVERE pain (e.g., excruciating) AND [2] present > 1 hour    Protocols used: ST ABDOMINAL PAIN - UPPER-A-    Patient states she has severe abdominal pain. Has "lots of stomach acid and bile" she was told by GI md. Advised patient to go to ED for evaluation. She verbalized understanding.   "

## 2017-09-18 ENCOUNTER — TELEPHONE (OUTPATIENT)
Dept: SLEEP MEDICINE | Facility: OTHER | Age: 52
End: 2017-09-18

## 2017-09-18 ENCOUNTER — TELEPHONE (OUTPATIENT)
Dept: GASTROENTEROLOGY | Facility: CLINIC | Age: 52
End: 2017-09-18

## 2017-09-18 NOTE — TELEPHONE ENCOUNTER
Left message asking pt to let us know how she is doing and also to advise her, per Jessica Min, to take Zantac 150mg BID.

## 2017-09-26 ENCOUNTER — OFFICE VISIT (OUTPATIENT)
Dept: NEUROLOGY | Facility: CLINIC | Age: 52
End: 2017-09-26
Payer: COMMERCIAL

## 2017-09-26 VITALS
SYSTOLIC BLOOD PRESSURE: 122 MMHG | RESPIRATION RATE: 16 BRPM | DIASTOLIC BLOOD PRESSURE: 90 MMHG | WEIGHT: 158.31 LBS | BODY MASS INDEX: 35.61 KG/M2 | HEIGHT: 56 IN | HEART RATE: 74 BPM

## 2017-09-26 DIAGNOSIS — M47.812 SPONDYLOSIS OF CERVICAL REGION WITHOUT MYELOPATHY OR RADICULOPATHY: ICD-10-CM

## 2017-09-26 DIAGNOSIS — M79.18 MYOFASCIAL PAIN SYNDROME, CERVICAL: Primary | ICD-10-CM

## 2017-09-26 DIAGNOSIS — M54.81 OCCIPITAL NEURALGIA, UNSPECIFIED LATERALITY: ICD-10-CM

## 2017-09-26 DIAGNOSIS — G44.86 CERVICOGENIC HEADACHE: ICD-10-CM

## 2017-09-26 DIAGNOSIS — G43.719 CHRONIC MIGRAINE WITHOUT AURA, WITH INTRACTABLE MIGRAINE, SO STATED, WITHOUT MENTION OF STATUS MIGRAINOSUS: ICD-10-CM

## 2017-09-26 PROCEDURE — 3080F DIAST BP >= 90 MM HG: CPT | Mod: S$GLB,,, | Performed by: PSYCHIATRY & NEUROLOGY

## 2017-09-26 PROCEDURE — 3074F SYST BP LT 130 MM HG: CPT | Mod: S$GLB,,, | Performed by: PSYCHIATRY & NEUROLOGY

## 2017-09-26 PROCEDURE — 99999 PR PBB SHADOW E&M-EST. PATIENT-LVL III: CPT | Mod: PBBFAC,,, | Performed by: PSYCHIATRY & NEUROLOGY

## 2017-09-26 PROCEDURE — 99215 OFFICE O/P EST HI 40 MIN: CPT | Mod: S$GLB,,, | Performed by: PSYCHIATRY & NEUROLOGY

## 2017-09-26 PROCEDURE — 3008F BODY MASS INDEX DOCD: CPT | Mod: S$GLB,,, | Performed by: PSYCHIATRY & NEUROLOGY

## 2017-09-26 RX ORDER — METHYLNALTREXONE BROMIDE 150 MG/1
TABLET ORAL
Refills: 4 | COMMUNITY
Start: 2017-09-23

## 2017-09-26 RX ORDER — DIPHENOXYLATE HYDROCHLORIDE AND ATROPINE SULFATE 2.5; .025 MG/1; MG/1
TABLET ORAL
Refills: 1 | COMMUNITY
Start: 2017-08-18 | End: 2019-04-25 | Stop reason: CLARIF

## 2017-09-26 RX ORDER — RIZATRIPTAN BENZOATE 10 MG/1
TABLET ORAL
Qty: 18 TABLET | Refills: 3 | Status: SHIPPED | OUTPATIENT
Start: 2017-09-26 | End: 2019-04-25 | Stop reason: CLARIF

## 2017-09-26 RX ORDER — TIZANIDINE 4 MG/1
TABLET ORAL
Qty: 45 TABLET | Refills: 3 | Status: SHIPPED | OUTPATIENT
Start: 2017-09-26 | End: 2018-01-29 | Stop reason: SDUPTHER

## 2017-09-26 RX ORDER — PROMETHAZINE HYDROCHLORIDE 25 MG/1
TABLET ORAL
Refills: 1 | COMMUNITY
Start: 2017-09-25

## 2017-09-26 RX ORDER — LIDOCAINE AND PRILOCAINE 25; 25 MG/G; MG/G
CREAM TOPICAL
Refills: 0 | COMMUNITY
Start: 2017-09-07 | End: 2019-05-15 | Stop reason: SDUPTHER

## 2017-09-26 RX ORDER — POTASSIUM CHLORIDE 20 MEQ/1
TABLET, EXTENDED RELEASE ORAL
Refills: 4 | COMMUNITY
Start: 2017-09-01

## 2017-09-26 NOTE — PATIENT INSTRUCTIONS
WORKUP:  -- none    PREVENTION (use daily regardless of headache):  -- stop topamax   -- continue gabapentin 100mg nightly (will probably stop in the future)  -- medication-wise, best all around medication for headache, neck pain, fibromyalgia is venlafaxine (this is weight neutral)   -- return to clinic for trigger point injection and nerve block   -- start physical therapy for cervical myofascial release    ACUTE TREATMENT (use total no more than 10 days per month unless otherwise stated):  -- return to using Maxalt 10mg at onset of severe migraine, may repeat every 2 hours for max 3 times in one day   -- try tizanidine 4 to 6 mg again at night for muscle spasm

## 2017-09-26 NOTE — PROGRESS NOTES
Date of service: 9/26/2017  Referring provider: Aaareferral Self    Subjective:      Chief complaint: Headache       Patient ID: Karla Bravo is a 52 y.o. lady with hypertension, cervical disc disease with radiculopathy, history of C4-7 anterior fusion (2015 and 12/2016) (Dr. Dickinson, neurosurgery in [place]), and fibryomyalgia presenting for a second opinion on headaches and pain. Seen by PRIMITIVO Robb on 8/15/17 and recommended for Botox. Has only one kidney due to needing removal for very large kidney stone.     History of Present Illness    ORIGINAL HEADACHE HISTORY -   Age at onset and course over time: headaches since age 17, worse since ~2015. Underwent 2 cervical fusions (first at C6-7 then C4-6 for myelopathy). Seems like everything got worse after the second fusion. Is on chronic opiate therapy. Being evaluated for cervical dorsal column stimulator. Had history of pain pump, now removed.   Location: pain in in between the scapula and mid thoracic spine. Entire scapula hurts. Headaches - start occipital and move along courses of occipital nerves to temples. Right more than left. Sometimes forehead hurts too.   Quality: throbbing, pinching, stabbing  Severity: 7 - 10   Duration: >4 hours to days  Frequency: daily   Alleviated by: medication   Exacerbated by: neck movement, light   Associated with: Photophobia, phonophobia, osmophobia, blurred vision, double vision, loss of appetite, nausea, vomiting, dizziness, irritability, anxiety, nasal congestion, nasal pressure, problems with task completion, neck tightness, neck pain   Sleep habits:  Caffeine intake:    Current acute treatment:  -- fioricet - daily for last 14 days; prior to that every other day or every 2 days   -- flexeril 10mg BID every day - losing efficacy   -- norco 10mg - neck, 1 - 2 times per day; previously on percocet   -- compound cream    Current prevention:  -- topamax 25mg daily - whole pill feels too strong   -- gabapentin 100mg at  night - told not to take more by nephrologist     Previously tried/failed acute treatment:  -- Imitrex - used to work but stopped working   -- maxalt - recently taken off   -- toradol    Previously tried/failed preventative treatment:  -- amitriptyline - many years ago   -- savella - weight gain   -- lyrica - caused restless legs   -- cymbalta (myalgia, weight gain)  -- amitriptyline years ago  --  Botox once day before her neck surgery '16  -- ? Inderal  -- tizanidine - ineffective  -- baclofen     Review of patient's allergies indicates:   Allergen Reactions    Adhesive Hives and Rash    Antihistamines - alkylamine Rash     hives    Demerol [meperidine] Other (See Comments)     Weakness, lethargy    Levaquin [levofloxacin] Hives    Macrobid [nitrofurantoin monohyd/m-cryst] Hives    Macrodantin [nitrofurantoin macrocrystalline]     Morphine Itching     Pill form    Penicillins Rash    Sulfa (sulfonamide antibiotics) Rash     Current Outpatient Prescriptions   Medication Sig Dispense Refill    alprazolam (XANAX) 1 MG tablet Take 1 mg by mouth 3 (three) times daily as needed for Anxiety.   2    butalbital-acetaminophen-caffeine -40 mg (FIORICET, ESGIC) -40 mg per tablet Take 1 tablet by mouth every 6 (six) hours as needed.      COCONUT OIL (COCONUT MISC) 1 tablet by Misc.(Non-Drug; Combo Route) route once daily.      cpm-phenyleph-acetaminophen 4- mg Tab Take 1 tablet by mouth 3 (three) times daily as needed.      cyclobenzaprine (FLEXERIL) 10 MG tablet Take 10 mg by mouth 2 (two) times daily.   1    dicyclomine (BENTYL) 10 MG capsule   4    fluticasone (FLONASE) 50 mcg/actuation nasal spray   4    hydrocodone-acetaminophen 10-325mg (NORCO)  mg Tab Take 1 tablet by mouth every 6 (six) hours as needed.       lactobacillus acidophilus (ACIDOPHILUS) Cap Take 1 capsule by mouth once daily.      ondansetron (ZOFRAN-ODT) 8 MG TbDL Take 1 tablet (8 mg total) by mouth every 12  (twelve) hours as needed. 20 tablet 3    ranitidine (ZANTAC) 150 MG capsule Take 1 capsule (150 mg total) by mouth 2 (two) times daily. 60 capsule 11    diphenoxylate-atropine 2.5-0.025 mg (LOMOTIL) 2.5-0.025 mg per tablet   1    HYDROCORT/MIN OIL/PETROLAT,WHT (HYDROCORTISONE-MIN OIL-WHT PET) 1 % Oint APPLY SMALL AMOUNT (1-2 GRAMS) TO AFFECTED AREA 2-4 TIMES DAILY AS DIRECTED FOR REDNESS RELIEF.  0    lidocaine-prilocaine (EMLA) cream APPLY A SMALL AMOUNT (2-3GRAMS) TOPICALLY TO AFFECTED AREA  3 TIMES A DAY AS DIRECTED.  0    potassium chloride SA (K-DUR,KLOR-CON) 20 MEQ tablet   4    promethazine (PHENERGAN) 25 MG tablet   1    RELISTOR 150 mg Tab   4    rizatriptan (MAXALT) 10 MG tablet 1 tab PO PRN migraine. May repeat every 2 hours for max 3 tabs in 24 hours. Use no more than 10 days per month. 18 tablet 3    tizanidine (ZANAFLEX) 4 MG tablet Full or 1.5 tablet by mouth at night as needed for muscle spasm 45 tablet 3     No current facility-administered medications for this visit.        Past Medical History  Past Medical History:   Diagnosis Date    Anxiety     Arthritis     Bronchitis     related to sinuses    Depression     Fibromyalgia     Hypertension     said takes BP med PRN    Kidney stones     right side    Migraine     Recurrent UTI     S/P Botox injection     16 for Migraines       Past Surgical History  Past Surgical History:   Procedure Laterality Date    CERVICAL FUSION       SECTION  , ,     CYSTOSCOPY W/ URETERAL STENT PLACEMENT Right     10 times    LITHOTRIPSY Right 2012 to present    right side 9 times    NEPHRECTOMY Right 2014    Robotic assisted    pain pump  2007    removed 2009    TONSILLECTOMY  1989    URETER SURGERY Right 3/22/2013 & 2014    Robotic both surgeries       Family History  Family History   Problem Relation Age of Onset    Hyperlipidemia Mother     Diabetes Father     Heart disease Father     Hypertension Father      Kidney disease Father     Hyperlipidemia Brother        Social History  Social History     Social History    Marital status:      Spouse name: N/A    Number of children: N/A    Years of education: N/A     Occupational History    Not on file.     Social History Main Topics    Smoking status: Never Smoker    Smokeless tobacco: Never Used    Alcohol use No    Drug use: No    Sexual activity: Not on file     Other Topics Concern    Not on file     Social History Narrative    No narrative on file        Review of Systems  Constitutional: no weight loss, no change to appetite   Eyes: no change to vision, no redness, no tearing  Ears, nose, mouth, throat: no hearing loss, no tinnitus, no rhinorrhea, no difficulty swallowing   Cardiovascular: no palpitations  Respiratory: no shortness of breath, no cough   Gastrointestinal: no diarrhea, no constipation  Musculoskeletal: + neck pain  Skin: no rashes  Neurologic: no numbness, weakness, change to speech, loss of coordination   Endocrine: no heat or cold intolerance   Heme: no night sweats, no easy bruising   Psych: no depression     Objective:        Vitals:    09/26/17 1321   BP: (!) 122/90   Pulse: 74   Resp: 16     Body mass index is 35.49 kg/m².    Constitutional: appears in no acute distress, well-developed, well-nourished     Eyes: normal conjunctiva, PERRLA, optic discs are flat and sharp bilaterally     Ears, nose, mouth, throat: external appearance of ears and nose normal, hearing intact to finger rub     Cardiovascular: regular rate and rhythm, no murmurs appreciated, no carotid bruits     Respiratory: unlabored respirations, breath sounds normal bilaterally    Gastrointestinal: no abdominal masses, no tenderness, no visible hernia    Musculoskeletal: normal tone in all four extremities. No atrophy. No abnormal movements. Strength in all muscles groups of the upper and lower extremities is 5/5. Normal gait and station. Digits and nails normal.       Spine:   CERVICAL SPINE:  ROM: mild restriction to extension and lateral rotation katharine right   MUSCLE SPASM: multiple trigger points within the trapezius, paraspinals  FACET LOADING: bilateral   SPURLING: no  JO ANN / SURJIT tender: bilateral    Psychiatric: normal judgment and insight. Oriented to person, place, and time.     Neurologic:   Cortical functions: recent and remote memory intact, normal attention span and concentration, speech fluent, adequate fund of knowledge   Cranial nerves: visual fields full, PERRLA, EOMI, facial sensation intact in V1-V3, symmetric facial strength, hearing intact to finger rub, palate elevates symmetrically, shoulder shrug 5/5, tongue protrudes midline   Reflexes: 2+ in the upper and lower extremities, no Babinski, no Tyson  Sensation: intact to light touch and temperature   Coordination: normal finger to noise    Data Review:     6/27/17 Cervical XR  I personally reviewed this study which I interpreted to show no flexion / extension instability. Facet arthropathy at C7/T1    3/17/17 MRI cervical spine:  I personally reviewed this study which I interpreted to show anterior cervical fusion at the C4-C5, C5-C6 and C6-C7 levels and mild adjacent level degenerative changes      Results for orders placed or performed during the hospital encounter of 04/13/17   MRI Brain Without Contrast    Narrative    MRI BRAIN WITHOUT CONTRAST    Date: 04/13/17 12:20:57    History:  Headache.  Suboccipital headache., M50.11 Cervical disc disorder with radiculopathy, high cervical region;    Technique:  Standard multiplanar noncontrast sequences of the brain.  No previous comparison.    Findings:  The ventricles are non-dilated. No definite edema, hemorrhage or mass effect is seen. No extra-axial fluid collection.     Skull base appears normal.    The diffusion sequence is negative.    Impression         Negative noncontrast enhanced MRI of the brain.      Electronically signed by: NUSRAT BERUMEN  "MD  Date:     04/13/17  Time:    13:34        Lab Results   Component Value Date     11/21/2016    K 4.0 11/21/2016     11/21/2016    CO2 27 11/21/2016    BUN 13 11/21/2016    CREATININE 1.22 11/21/2016    GLU 94 11/21/2016    AST 21 08/17/2016    ALT 29 08/17/2016    ALBUMIN 4.3 08/17/2016    PROT 7.8 08/17/2016    BILITOT 0.3 08/17/2016       Lab Results   Component Value Date    WBC 6.74 11/21/2016    HGB 14.5 11/21/2016    HCT 44.3 11/21/2016    MCV 91 11/21/2016     (H) 11/21/2016       No results found for: TSH    Assessment & Plan:       Problem List Items Addressed This Visit     None      Visit Diagnoses     Myofascial pain syndrome, cervical    -  Primary    Relevant Medications    tizanidine (ZANAFLEX) 4 MG tablet    rizatriptan (MAXALT) 10 MG tablet    Other Relevant Orders    Ambulatory consult to Physical Therapy    Chronic migraine without aura, with intractable migraine, so stated, without mention of status migrainosus        Relevant Medications    rizatriptan (MAXALT) 10 MG tablet    Cervicogenic headache        Relevant Medications    tizanidine (ZANAFLEX) 4 MG tablet    Other Relevant Orders    Ambulatory consult to Physical Therapy    Spondylosis of cervical region without myelopathy or radiculopathy        Occipital neuralgia, unspecified laterality                  Ms. Bravo has life-long migraine history changed dramatically by second cervical fusion at C4 - C7 levels done to urgently address myelopathy. I discussed with her and her  a number of factors are likely at play with her current headaches - known migraine history, adjacent segment stress particularly at the C3-4 level (above fusion) as this level is capable of directly causing head pain, myofascial trigger points as a "guarding" reaction to neck pathology, and chronic opiate therapy and medication overuse.     As the easiest target I would like her first to undergo cervical trigger point injections and " occipital nerve blocks as well as cervical myofascial release. If this alone if insufficient then I would consider adding venlafaxine, but she tends to do poorly with medicines this is why it is not my first choice, as well as potentially cervical medial branch blocks at C2-3-4 and C7 levels. I would do these more conservative approaches first prior to proceeding with dorsal column stimulator especially given her problems with implanted pain pump in the past.     I see no reason why she can't take maxalt.     Would stop topamax since such a low dose and can't tolerate higher and probably ultimately gabapentin as well for same reason.      WORKUP:  -- none    PREVENTION (use daily regardless of headache):  -- stop topamax   -- continue gabapentin 100mg nightly (will probably stop in the future)  -- medication-wise, best all around medication for headache, neck pain, fibromyalgia is venlafaxine (this is weight neutral)   -- return to clinic for trigger point injection and nerve block   -- start physical therapy for cervical myofascial release    ACUTE TREATMENT (use total no more than 10 days per month unless otherwise stated):  -- return to using Maxalt 10mg at onset of severe migraine, may repeat every 2 hours for max 3 times in one day   -- try tizanidine 4 to 6 mg again at night for muscle spasm     Return in about 1 week (around 10/3/2017) for cervical trigger point / nerve block .    Emily Dumont MD

## 2017-09-27 ENCOUNTER — TELEPHONE (OUTPATIENT)
Dept: SLEEP MEDICINE | Facility: OTHER | Age: 52
End: 2017-09-27

## 2017-09-29 ENCOUNTER — TELEPHONE (OUTPATIENT)
Dept: NEUROLOGY | Facility: CLINIC | Age: 52
End: 2017-09-29

## 2017-09-29 NOTE — TELEPHONE ENCOUNTER
Ms. Bravo is scheduled for a nerve block on 10/9 at 9 am.    We will not have access to the ultrasound machine at that time.    Called and asked if she can come in on 10/6 in the am. She will have to check her schedule.     She asks for a call back at later time.    Remind me note sent to staff basket

## 2017-10-02 ENCOUNTER — TELEPHONE (OUTPATIENT)
Dept: SLEEP MEDICINE | Facility: OTHER | Age: 52
End: 2017-10-02

## 2017-10-02 NOTE — TELEPHONE ENCOUNTER
Dr. Colon no longer has availability on 10/6.Left vmail on mobile advising of this and asking for call to reschedule nerve block.    Left vmail at home # asking for call back

## 2017-10-04 ENCOUNTER — TELEPHONE (OUTPATIENT)
Dept: NEUROLOGY | Facility: CLINIC | Age: 52
End: 2017-10-04

## 2017-10-04 ENCOUNTER — PROCEDURE VISIT (OUTPATIENT)
Dept: NEUROLOGY | Facility: CLINIC | Age: 52
End: 2017-10-04
Payer: COMMERCIAL

## 2017-10-04 VITALS
SYSTOLIC BLOOD PRESSURE: 158 MMHG | WEIGHT: 156.5 LBS | HEART RATE: 88 BPM | DIASTOLIC BLOOD PRESSURE: 98 MMHG | BODY MASS INDEX: 35.21 KG/M2 | HEIGHT: 56 IN

## 2017-10-04 DIAGNOSIS — M54.81 OCCIPITAL NEURALGIA, UNSPECIFIED LATERALITY: ICD-10-CM

## 2017-10-04 DIAGNOSIS — M79.18 MYOFASCIAL PAIN SYNDROME, CERVICAL: Primary | ICD-10-CM

## 2017-10-04 PROCEDURE — 64405 NJX AA&/STRD GR OCPL NRV: CPT | Mod: 50,59,S$GLB, | Performed by: PSYCHIATRY & NEUROLOGY

## 2017-10-04 PROCEDURE — 20553 NJX 1/MLT TRIGGER POINTS 3/>: CPT | Mod: 51,S$GLB,, | Performed by: PSYCHIATRY & NEUROLOGY

## 2017-10-04 RX ORDER — BUPIVACAINE HYDROCHLORIDE 2.5 MG/ML
20 INJECTION, SOLUTION EPIDURAL; INFILTRATION; INTRACAUDAL ONCE
Status: COMPLETED | OUTPATIENT
Start: 2017-10-04 | End: 2017-10-04

## 2017-10-04 RX ORDER — LIDOCAINE HYDROCHLORIDE 10 MG/ML
5 INJECTION, SOLUTION EPIDURAL; INFILTRATION; INTRACAUDAL; PERINEURAL ONCE
Status: COMPLETED | OUTPATIENT
Start: 2017-10-04 | End: 2017-10-04

## 2017-10-04 RX ORDER — TRIAMCINOLONE ACETONIDE 40 MG/ML
40 INJECTION, SUSPENSION INTRA-ARTICULAR; INTRAMUSCULAR ONCE
Status: COMPLETED | OUTPATIENT
Start: 2017-10-04 | End: 2017-10-04

## 2017-10-04 RX ADMIN — LIDOCAINE HYDROCHLORIDE 50 MG: 10 INJECTION, SOLUTION EPIDURAL; INFILTRATION; INTRACAUDAL; PERINEURAL at 12:10

## 2017-10-04 RX ADMIN — TRIAMCINOLONE ACETONIDE 40 MG: 40 INJECTION, SUSPENSION INTRA-ARTICULAR; INTRAMUSCULAR at 12:10

## 2017-10-04 RX ADMIN — BUPIVACAINE HYDROCHLORIDE 50 MG: 2.5 INJECTION, SOLUTION EPIDURAL; INFILTRATION; INTRACAUDAL at 12:10

## 2017-10-04 NOTE — TELEPHONE ENCOUNTER
----- Message from Pippa Franco sent at 10/4/2017  2:08 PM CDT -----  Contact: [phone]  Patient is requesting a call back from the nurse in regards to getting an earlier appt time, she stated nurse didn't discuss the time.  She want the same date, just earlier time. No availability in epic to change it to a earlier time.  Please call the patient upon request at phone number [phone].

## 2017-10-04 NOTE — PROCEDURES
Procedures     PROCEDURE #1     Greater and Lesser Occipital Nerve Block, Bilateral     Diagnosis: occipital neuralgia     After risks and benefits were explained including bleeding, infection, worsening of the pain, damage to the area being injected, weakness, allergic reaction to medications, vascular injection, and nerve damage, signed consent was obtained. All questions were answered.     The area of the greater occipital nerve was identified as a point 1/3rds the distance  between the occipital protuberance and the ipsilateral mastoid process. The area of the lesser occipital nerve was identified as a point 2/3rds the distance between the occipital protuberance and the ipsilateral mastoid process.The identified areas were prepped three times with alcohol and the alcohol allowed to dry. Next, a 30 gauge 1 inch needle was placed in the anatomical area of the JO ANN. Once reproduction of the pain was elicited and negative aspiration confirmed, I proceeded with the injection. This was repeated for the SURJIT. The exact procedure was repeated on the contralateral side.     Adequacy of the block was confirmed by sensory examination demonstrating anesthesia in the territory of the JO ANN and the SURJIT.     Medication used: Marcaine 0.25% (18mg), lidocaine 1% (20mg) and Triamcinolone 20mg     Total volume injected:  10cc    Estimated blood loss: none    The patient tolerated the procedure well and there were no complications.     -----------------------------------------------------------------------------------------------------------------    PROCEDURE #2     TRIGGER POINT INJECTION (CERVICAL)     Indication: cervical myofascial pain     Anesthesia: none     After risks and benefits were explained including bleeding, infection, worsening of the pain, damage to the area being injected, weakness, allergic reaction to medications, vascular injection, and nerve damage, signed consent was obtained. All questions were answered.      Trigger points were identified by palpation of tight muscle fibers with reproduction of patient's pain and referral pattern upon palpation. The identified areas were prepped three times with alcohol and the alcohol allowed to dry. Next, a 30 gauge 1 inch needle was placed in the anatomical area of the trigger point ot be injected. Once negative aspiration of air and heme was confirmed, I proceeded with the injection.     Medications used: bupivicaine 0.25% (18mg), lidocaine 1% (20mg) and Triamcinolone 20mg     Total volume injected: 10cc     Trigger points injected:  -- right and left semispinalis capitus (2 points)  -- right and left upper trapezius (1 point each side)  -- right and left splenius capitus (1 point each side)    Estimated blood loss: none     The patient did tolerate the procedure well and there were no complications.    RTC in 2 months

## 2017-10-04 NOTE — TELEPHONE ENCOUNTER
Called and offered 10/6 at 9:40 for nerve block. She will check transportation and let us know if she is unable to make it.

## 2017-10-06 ENCOUNTER — TELEPHONE (OUTPATIENT)
Dept: NEUROLOGY | Facility: CLINIC | Age: 52
End: 2017-10-06

## 2017-10-06 NOTE — TELEPHONE ENCOUNTER
----- Message from Deja Reinoso sent at 10/6/2017  3:23 PM CDT -----  Contact: patient  Patient called to advise that she is feeling like she has a fever,vomiting.patient thinks its from the injections.please call back to advise at [phone]. Thanks,

## 2017-10-06 NOTE — TELEPHONE ENCOUNTER
Returned call and spoke with patient. Patient stated that she starting feeling ill since yesterday. Patient stated that her stomach is bloated, she has been vomiting, and has had a fever of 99.8 since this morning. Patient stated her face is flushed and red. Patient states she thinks it is related to her injections. Informed patient that those symptoms are not common for Nerve Block injections. Advise patient to go the ER. Patient stated she did not have a ride, but was going to try and find a ride. Instructed patient to call an ambulance is she was not able to get a ride. Patient verbalized understanding.

## 2017-10-07 ENCOUNTER — NURSE TRIAGE (OUTPATIENT)
Dept: ADMINISTRATIVE | Facility: CLINIC | Age: 52
End: 2017-10-07

## 2017-10-08 NOTE — TELEPHONE ENCOUNTER
Pt called re getting in touch with gasto. Still having difficulty breathing with GERD/indigestion, no BM x 4 days. Vomiting constantly. C/o CP between breast bone. Seen four doctors. Still throwing up. pt crying hysterically on speaker phone. Pt repeated stating that she is having SOB. rec EMS. Pt son just arrived to help her. Pt states that she wants her GI MD paged despite EMS in route. Spoke with deputy tran at 721pm- EMS in route. Spoke with dr rodriguez. MD states that she will give pt a call.   Pharm: [facility] 30/44    Reason for Disposition   Sounds like a life-threatening emergency to the triager    Protocols used: ST BREATHING DIFFICULTY-A-AH

## 2017-10-09 ENCOUNTER — TELEPHONE (OUTPATIENT)
Dept: NEUROLOGY | Facility: CLINIC | Age: 52
End: 2017-10-09

## 2017-10-09 ENCOUNTER — TELEPHONE (OUTPATIENT)
Dept: GASTROENTEROLOGY | Facility: CLINIC | Age: 52
End: 2017-10-09

## 2017-10-09 NOTE — TELEPHONE ENCOUNTER
Returned call and spoke with patient. Patient is stating she is having pain on the left side of her behind her ear. Patient stated she has been taking her Maxalt and it has not been helping and she refuses to take her Norco. She said she has had a constant headache since 10/04. Please advise.

## 2017-10-09 NOTE — TELEPHONE ENCOUNTER
Returned call and spoke with patient. Informed her per Dr. Dumont's note below. Patient verbalized understanding.

## 2017-10-09 NOTE — TELEPHONE ENCOUNTER
No I dont think it is either. She was already feeling ill when she came for injections, she told me she was coming down with something I told her I thought was viral.

## 2017-10-09 NOTE — TELEPHONE ENCOUNTER
----- Message from Vaishali Rodriguez sent at 10/9/2017  9:41 AM CDT -----  Pt was seen last week / Had injections/ States she is in severe pain on left side ... Pain in head ... Call pt at [phone]

## 2017-10-09 NOTE — TELEPHONE ENCOUNTER
She has made multiple phone calls to other docs with multiple symptoms - fever, headache, constipation, trouble breathing. Must be checked out we can't treat headache in isolation needs to be seen in ED for work up of acute symptoms prioritizing her chest pain and difficulty breathing.

## 2017-10-09 NOTE — TELEPHONE ENCOUNTER
Ms Lawrence c/o of continued constipation and abdominal pain. Went to ED 10/7/17 and was told she has opoid induced constipation. Is continuing to take miralax. BM's are very small. Does she need to come in sooner than her 10/26 apt?

## 2017-10-09 NOTE — TELEPHONE ENCOUNTER
Called and spoke with patient. Patient stated she already went to the ED for her other symptoms and was diagnosed with severe constipation. She states that the previous symptoms are not related to her headache and were related to the constipation. Patient refused to go to the ED. She wants to know what medication she can take and if you will send anything over to pharmacy. Patient was really aggressive and stated if you wouldn't do anything for her then she was going to call her PCP.

## 2017-10-09 NOTE — TELEPHONE ENCOUNTER
Cocktail of Baclofen 10mg TID + hydroxyzine 50mg TID x 3 days. She should stop her tizanidine during this.

## 2017-10-11 DIAGNOSIS — M79.18 MYOFASCIAL PAIN SYNDROME: Primary | ICD-10-CM

## 2017-10-11 RX ORDER — HYDROXYZINE HYDROCHLORIDE 50 MG/1
50 TABLET, FILM COATED ORAL 3 TIMES DAILY
Qty: 9 TABLET | Refills: 0 | Status: SHIPPED | OUTPATIENT
Start: 2017-10-11 | End: 2019-04-25 | Stop reason: CLARIF

## 2017-10-11 RX ORDER — BACLOFEN 10 MG/1
10 TABLET ORAL 3 TIMES DAILY
Qty: 9 TABLET | Refills: 0 | Status: SHIPPED | OUTPATIENT
Start: 2017-10-11 | End: 2019-04-25 | Stop reason: CLARIF

## 2017-10-11 NOTE — TELEPHONE ENCOUNTER
Patient would like her medication called to [facility], however if this is to get sent over around 5pm the medication will need to be sent to the [facility] on file.

## 2017-10-11 NOTE — TELEPHONE ENCOUNTER
----- Message from Memo BALDOMERO Tati sent at 10/10/2017  2:40 PM CDT -----  Contact: same  Patient called in to check the status of a couple of new Rx's (but did not know the names).  Patient wanted to check the status of when they would be call in.    [facility] - [place], [place] - [address] [facility] [address]  [address] [address] [address]  Phone: [phone] Fax: [phone]    Patient call back number is [phone]

## 2017-11-24 ENCOUNTER — TELEPHONE (OUTPATIENT)
Dept: SLEEP MEDICINE | Facility: OTHER | Age: 52
End: 2017-11-24

## 2017-12-28 ENCOUNTER — TELEPHONE (OUTPATIENT)
Dept: SLEEP MEDICINE | Facility: OTHER | Age: 52
End: 2017-12-28

## 2018-01-29 DIAGNOSIS — M79.18 MYOFASCIAL PAIN SYNDROME, CERVICAL: ICD-10-CM

## 2018-01-29 DIAGNOSIS — G44.86 CERVICOGENIC HEADACHE: ICD-10-CM

## 2018-01-29 NOTE — TELEPHONE ENCOUNTER
Returned call and spoke with patient. Patient stated that her Zanaflex and Maxalt are no longer helping her. Please advise.

## 2018-01-29 NOTE — TELEPHONE ENCOUNTER
----- Message from Vandanasebastien Resendiz sent at 1/29/2018  2:53 PM CST -----  Contact: Patient  Patient called advising that she does not believe the headache and muscle spasm medication is working.  She advised that she believes it is giving her restless leg syndrom.  She also advised that the headaches are so bad that she cannot sleep at night.  Patient would like to discuss this with Dr. Dumont or her nurse to see what other options she has.  She also advised that she is leaving to go out of town on Thursday, 2/1/18, at 6 AM.  Please call patient back at [phone].  Thank you!

## 2018-01-30 RX ORDER — TIZANIDINE 4 MG/1
TABLET ORAL
Qty: 45 TABLET | Refills: 3 | Status: ON HOLD | OUTPATIENT
Start: 2018-01-30 | End: 2019-04-27 | Stop reason: HOSPADM

## 2018-01-30 NOTE — TELEPHONE ENCOUNTER
We need to see her in the clinic to address worsening headaches, I can't make decisions over the phone without a thorough exam

## 2018-01-30 NOTE — TELEPHONE ENCOUNTER
Called and spoke with patient. Informed her of Dr. Dumont's note below. Patient stated she is going of town and is not sure when she will be back. Patient stated she would call back to schedule once she is back in town.

## 2018-06-16 DIAGNOSIS — G44.86 CERVICOGENIC HEADACHE: ICD-10-CM

## 2018-06-16 DIAGNOSIS — M79.18 MYOFASCIAL PAIN SYNDROME, CERVICAL: ICD-10-CM

## 2018-06-18 RX ORDER — TIZANIDINE 4 MG/1
TABLET ORAL
Qty: 45 TABLET | Refills: 3 | OUTPATIENT
Start: 2018-06-18

## 2018-07-02 DIAGNOSIS — G43.719 INTRACTABLE CHRONIC MIGRAINE WITHOUT AURA: ICD-10-CM

## 2018-07-03 RX ORDER — ONDANSETRON 8 MG/1
TABLET, ORALLY DISINTEGRATING ORAL
Qty: 20 TABLET | Refills: 1 | Status: SHIPPED | OUTPATIENT
Start: 2018-07-03

## 2018-12-18 ENCOUNTER — OFFICE VISIT (OUTPATIENT)
Dept: NEUROSURGERY | Facility: CLINIC | Age: 53
End: 2018-12-18
Payer: COMMERCIAL

## 2018-12-18 VITALS
WEIGHT: 156 LBS | HEART RATE: 119 BPM | BODY MASS INDEX: 35.09 KG/M2 | HEIGHT: 56 IN | SYSTOLIC BLOOD PRESSURE: 130 MMHG | DIASTOLIC BLOOD PRESSURE: 86 MMHG

## 2018-12-18 DIAGNOSIS — M79.18 CERVICAL MYOFASCIAL PAIN SYNDROME: ICD-10-CM

## 2018-12-18 DIAGNOSIS — S12.9XXA PSEUDOARTHROSIS OF CERVICAL SPINE, INITIAL ENCOUNTER: Primary | ICD-10-CM

## 2018-12-18 PROCEDURE — 3075F SYST BP GE 130 - 139MM HG: CPT | Mod: CPTII,S$GLB,, | Performed by: NEUROLOGICAL SURGERY

## 2018-12-18 PROCEDURE — 3079F DIAST BP 80-89 MM HG: CPT | Mod: CPTII,S$GLB,, | Performed by: NEUROLOGICAL SURGERY

## 2018-12-18 PROCEDURE — 99999 PR PBB SHADOW E&M-EST. PATIENT-LVL V: CPT | Mod: PBBFAC,,, | Performed by: NEUROLOGICAL SURGERY

## 2018-12-18 PROCEDURE — 99213 OFFICE O/P EST LOW 20 MIN: CPT | Mod: S$GLB,,, | Performed by: NEUROLOGICAL SURGERY

## 2018-12-18 PROCEDURE — 3008F BODY MASS INDEX DOCD: CPT | Mod: CPTII,S$GLB,, | Performed by: NEUROLOGICAL SURGERY

## 2018-12-18 RX ORDER — TEMAZEPAM 22.5 MG/1
22.5 CAPSULE ORAL NIGHTLY PRN
COMMUNITY
End: 2019-04-25 | Stop reason: CLARIF

## 2018-12-18 NOTE — PROGRESS NOTES
NEUROSURGICAL OUTPATIENT CONSULTATION NOTE    DATE OF SERVICE:  12/18/2018    ATTENDING PHYSICIAN:  Ayaan Bean MD          SUBJECTIVE:    HISTORY OF PRESENT ILLNESS:  This is a very pleasant 53 y.o. female who had a C4-6 ACDF in 2016 and a C6-7 stand-alone ACDF in 2017. Not working because of the pain. Mainly stay at home. Has developed worsening neck and mid-thoracic back pain, right shoulder pain. Pain is constant, even when she watches tv. Neck ROM makes it worse in all directions. Complains of some hands numbness and weakness. No significant gait imbalance. Has done multiples right shoulder injections and spinal injections with some pain relief. Cannot take NSAIDs. History of chronic narcotics use, had a pain pump before. Has been doing PT for her back and right shoulder pain in [place] with mild pain improvement. Is not doing deep neck flexor muscle strengthening exercises at this time.     Low Back Pain Scale  R Low Back-Pain Score: 10  R Low Back-Pain Intensity: Pain killers give very little relief from pain  R Low Back-Pain Score: I need some help, but manage most of my personal care  Low Back-Lifting: I can only lift very light weights   Low Back-Walking: I can only walk using a cane or crutches   Low Back-Sitting: Pain prevents me from sitting more than 30 minutes   Low Back-Standing: I cannot stand for longer than 10 minutes with increasing pain   Low Back-Sleeping: Because of pain my normal nights sleep is reduced by less than three quarters   Low Back-Social Life: Pain has restricted my social life and I do not go out very often   Low Back-Traveling: Pain restricts me to short necessary journeys under 30 minutes   Low Back-Changing Degree of Pain: my pain is rapidly worsening    Neck Disability  Neck Disability-Pain Score: 8  Neck Disability-Pain Intensity: The pain is fairly severe at the moment  Neck Disability-Personal Care: I need some help, but manage most of my personal  Neck  Disability-Lifting: Pain prevents me from lifting heavy weights off the floor but I can manage light to medium weights if they are conveniently positioned  Neck Disability-Reading: I cnat read as much as I want to, because of moderate pain in my neck  Neck Disability-Headaches: I have headaches almost all of the time  Neck Disability-Concentration: I have a fair degree of difficulty in concentratng when I want to  Neck Disability-work: I cannot do any work at all  Neck Disability-Driving: I cant drive my car as long as I want because od moderate pain in my neck  Neck Disability-Sleeping: My sleep is greatly disturbed (3-5 hours sleeplessness)  Neck Disability-Recreation: I can hardly do any recreation activities because of pain in my neck    PAST MEDICAL HISTORY:  Active Ambulatory Problems     Diagnosis Date Noted    HTN (hypertension) 2014    Renal insufficiency 2014    SOB (shortness of breath) 2014    Renal stones 2014    Spinal stenosis of cervical region 2015    Cervical disc disorder with radiculopathy of mid-cervical region 2016    Cervical disc disorder at C6-C7 level with radiculopathy 2016     Resolved Ambulatory Problems     Diagnosis Date Noted    No Resolved Ambulatory Problems     Past Medical History:   Diagnosis Date    Anxiety     Arthritis     Bronchitis     Depression     Fibromyalgia     Hypertension     Kidney stones     Migraine     Recurrent UTI     S/P Botox injection        PAST SURGICAL HISTORY:  Past Surgical History:   Procedure Laterality Date    CERVICAL FUSION       SECTION  , ,     CYSTOSCOPY W/ URETERAL STENT PLACEMENT Right     10 times    FUSION CERVICAL ANTERIOR C6-C7, DISCECTOMY C6-C7 N/A 2016    [facility]    FUSION CERVICAL ANTERIOR WITH NAVIGATION-(ACDF)- C4-5  C5-6 N/A 2015    [facility]    HARVESTING-BONE  GRAFT N/A 11/17/2015    [facility]    HARVESTING-BONE GRAFT - iliac crest Right 12/9/2016    [facility]    LITHOTRIPSY Right 8/2012 to present    right side 9 times    NEPHRECTOMY Right 9/2014    Robotic assisted    pain pump  2007    removed 2009    TONSILLECTOMY  1989    URETER SURGERY Right 3/22/2013 & 2014    Robotic both surgeries       SOCIAL HISTORY:   Social History     Socioeconomic History    Marital status:      Spouse name: Not on file    Number of children: Not on file    Years of education: Not on file    Highest education level: Not on file   Social Needs    Financial resource strain: Not on file    Food insecurity - worry: Not on file    Food insecurity - inability: Not on file    Transportation needs - medical: Not on file    Transportation needs - non-medical: Not on file   Occupational History    Not on file   Tobacco Use    Smoking status: Never Smoker    Smokeless tobacco: Never Used   Substance and Sexual Activity    Alcohol use: No    Drug use: No    Sexual activity: Not on file   Other Topics Concern    Not on file   Social History Narrative    Not on file       FAMILY HISTORY:  Family History   Problem Relation Age of Onset    Hyperlipidemia Mother     Diabetes Father     Heart disease Father     Hypertension Father     Kidney disease Father     Hyperlipidemia Brother        CURRENTS MEDICATIONS:  Current Outpatient Medications on File Prior to Visit   Medication Sig Dispense Refill    butalbital-acetaminophen-caffeine -40 mg (FIORICET, ESGIC) -40 mg per tablet Take 1 tablet by mouth every 6 (six) hours as needed.      COCONUT OIL (COCONUT MISC) 1 tablet by Misc.(Non-Drug; Combo Route) route once daily.      fluticasone (FLONASE) 50 mcg/actuation nasal spray   4    lactobacillus acidophilus (ACIDOPHILUS) Cap Take 1 capsule by mouth once daily.       lidocaine-prilocaine (EMLA) cream APPLY A SMALL AMOUNT (2-3GRAMS) TOPICALLY TO AFFECTED AREA  3 TIMES A DAY AS DIRECTED.  0    ondansetron (ZOFRAN-ODT) 8 MG TbDL DISSOLVE 1 TABLET BY MOUTH EVERY 12 HOURS AS NEEDED 20 tablet 1    potassium chloride SA (K-DUR,KLOR-CON) 20 MEQ tablet   4    promethazine (PHENERGAN) 25 MG tablet   1    RELISTOR 150 mg Tab   4    temazepam (RESTORIL) 22.5 MG capsule Take 22.5 mg by mouth nightly as needed for Insomnia.      tiZANidine (ZANAFLEX) 4 MG tablet TAKE 1 OR 1 & 1/2 TABLET(S) BY MOUTH AT NIGHT AS NEEDED FOR MUSCLE SPASM 45 tablet 3    alprazolam (XANAX) 1 MG tablet Take 1 mg by mouth 3 (three) times daily as needed for Anxiety.   2    baclofen (LIORESAL) 10 MG tablet Take 1 tablet (10 mg total) by mouth 3 (three) times daily. Take 1 tablet by mouth 3 times daily for 3 days. 9 tablet 0    cpm-phenyleph-acetaminophen 4- mg Tab Take 1 tablet by mouth 3 (three) times daily as needed.      cyclobenzaprine (FLEXERIL) 10 MG tablet Take 10 mg by mouth 2 (two) times daily.   1    dicyclomine (BENTYL) 10 MG capsule   4    diphenoxylate-atropine 2.5-0.025 mg (LOMOTIL) 2.5-0.025 mg per tablet   1    hydrocodone-acetaminophen 10-325mg (NORCO)  mg Tab Take 1 tablet by mouth every 6 (six) hours as needed.       HYDROCORT/MIN OIL/PETROLAT,WHT (HYDROCORTISONE-MIN OIL-WHT PET) 1 % Oint APPLY SMALL AMOUNT (1-2 GRAMS) TO AFFECTED AREA 2-4 TIMES DAILY AS DIRECTED FOR REDNESS RELIEF.  0    hydrOXYzine (ATARAX) 50 MG tablet Take 1 tablet (50 mg total) by mouth 3 (three) times daily. Take 1 tablet by mouth 3 times daily for 3 days. 9 tablet 0    ranitidine (ZANTAC) 150 MG capsule Take 1 capsule (150 mg total) by mouth 2 (two) times daily. 60 capsule 11    rizatriptan (MAXALT) 10 MG tablet 1 tab PO PRN migraine. May repeat every 2 hours for max 3 tabs in 24 hours. Use no more than 10 days per month. 18 tablet 3     No current facility-administered medications on file prior  to visit.        ALLERGIES:  Review of patient's allergies indicates:   Allergen Reactions    Adhesive Hives and Rash    Antihistamines - alkylamine Rash     hives    Demerol [meperidine] Other (See Comments)     Weakness, lethargy    Levaquin [levofloxacin] Hives    Macrobid [nitrofurantoin monohyd/m-cryst] Hives    Macrodantin [nitrofurantoin macrocrystalline]     Morphine Itching     Pill form    Penicillins Rash    Sulfa (sulfonamide antibiotics) Rash       REVIEW OF SYSTEMS:  Review of Systems   Constitutional: Negative for diaphoresis, fever and weight loss.   Respiratory: Negative for shortness of breath.    Cardiovascular: Negative for chest pain.   Gastrointestinal: Negative for blood in stool.   Genitourinary: Negative for hematuria.   Endo/Heme/Allergies: Does not bruise/bleed easily.   All other systems reviewed and are negative.      OBJECTIVE:    PHYSICAL EXAMINATION:   Vitals:    12/18/18 1322   BP: 130/86   Pulse: (!) 119       Physical Exam:  Vitals reviewed.    Constitutional: She appears well-developed and well-nourished.     Eyes: Pupils are equal, round, and reactive to light. Conjunctivae and EOM are normal.     Cardiovascular: Normal distal pulses and no edema.     Abdominal: Soft.     Skin: Skin displays no rash on trunk and no rash on extremities. Skin displays no lesions on trunk and no lesions on extremities.     Psych/Behavior: She is alert. She is oriented to person, place, and time. She has a normal mood and affect.     Musculoskeletal:        Neck: Range of motion is limited.     Neurological:        DTRs: Tricep reflexes are 2+ on the right side and 2+ on the left side. Bicep reflexes are 2+ on the right side and 2+ on the left side. Brachioradialis reflexes are 2+ on the right side and 2+ on the left side. Patellar reflexes are 2+ on the right side and 2+ on the left side. Achilles reflexes are 2+ on the right side and 2+ on the left side.       Back Exam     Tenderness    The patient is experiencing tenderness in the cervical.    Range of Motion   Extension: normal   Flexion: normal   Lateral bend right: normal   Lateral bend left: normal   Rotation right: normal   Rotation left: normal     Muscle Strength   Right Quadriceps:  5/5   Left Quadriceps:  5/5   Right Hamstrings:  5/5   Left Hamstrings:  5/5     Tests   Straight leg raise right: negative  Straight leg raise left: negative    Other   Toe walk: normal  Heel walk: normal            SI joint:   Palpation at the right and left SI joints not painful  BINU test is negative bilaterally  Gaenslen test is negative bilaterally  Thigh thrust test is negative bilaterally    Neurologic Exam     Mental Status   Oriented to person, place, and time.   Speech: speech is normal   Level of consciousness: alert    Cranial Nerves   Cranial nerves II through XII intact.     CN III, IV, VI   Pupils are equal, round, and reactive to light.  Extraocular motions are normal.     Motor Exam   Muscle bulk: normal  Overall muscle tone: normal    Strength   Right deltoid: 5/5  Left deltoid: 5/5  Right biceps: 5/5  Left biceps: 5/5  Right triceps: 5/5  Left triceps: 5/5  Right wrist flexion: 5/5  Left wrist flexion: 5/5  Right wrist extension: 5/5  Left wrist extension: 5/5  Right interossei: 5/5  Left interossei: 5/5  Right iliopsoas: 5/5  Left iliopsoas: 5/5  Right quadriceps: 5/5  Left quadriceps: 5/5  Right hamstrin/5  Left hamstrin/5  Right anterior tibial: 5/5  Left anterior tibial: 5/5  Right posterior tibial: 5/5  Left posterior tibial: 5/5  Right peroneal: 5/5  Left peroneal: 5/5  Right gastroc: 5/5  Left gastroc: 5/5    Sensory Exam   Light touch normal.   Pinprick normal.     Gait, Coordination, and Reflexes     Gait  Gait: normal    Coordination   Finger to nose coordination: normal  Tandem walking coordination: normal    Reflexes   Right brachioradialis: 2+  Left brachioradialis: 2+  Right biceps: 2+  Left biceps: 2+  Right  triceps: 2+  Left triceps: 2+  Right patellar: 2+  Left patellar: 2+  Right achilles: 2+  Left achilles: 2+  Right plantar: normal  Left plantar: normal  Right Tyson: absent  Left Tyson: absent  Right ankle clonus: absent  Left ankle clonus: absent        DIAGNOSTIC DATA:  I personally interpreted the following imagin cervical CT myelogram and flexion-extension XR: possible facet movement at C6-7, CT myelogram does not show significant stenosis, acquire C6-7 kyphosis due to cage subsidence.     ASSESMENT:  This is a 53 y.o. female with     Problem List Items Addressed This Visit     None      Visit Diagnoses     Pseudoarthrosis of cervical spine, initial encounter    -  Primary    Relevant Orders    NM Fusion Spect CT    Ambulatory consult to Physical Therapy    Cervical myofascial pain syndrome        Relevant Orders    Ambulatory consult to Physical Therapy          PLAN:  Will FU after 6 weeks of PT aiming at posture correction and deep neck flexion strengthening exercises  Fusion SPECT CT to r/o pseudoarthrosis  Pain management with Dr Evelyn Bean MD  Cell:[phone]

## 2019-01-03 ENCOUNTER — TELEPHONE (OUTPATIENT)
Dept: NEUROSURGERY | Facility: CLINIC | Age: 54
End: 2019-01-03

## 2019-01-03 NOTE — TELEPHONE ENCOUNTER
Spoke to the patient she is not feeling well and on her way to the hospital and will call me back.

## 2019-01-03 NOTE — TELEPHONE ENCOUNTER
----- Message from Ayaan Bean MD sent at 1/3/2019 10:16 AM CST -----  Contact: patient      ----- Message -----  From: Estrella Jeff  Sent: 1/3/2019   9:04 AM  To: [facility] Staff    Please call pt at [phone]    Patient would like to be scheduled for a Spect CT test. Also has questions regarding her physical therapy orders    Thank you

## 2019-01-29 ENCOUNTER — TELEPHONE (OUTPATIENT)
Dept: NEUROSURGERY | Facility: CLINIC | Age: 54
End: 2019-01-29

## 2019-01-29 NOTE — TELEPHONE ENCOUNTER
----- Message from Marc Strange sent at 1/29/2019  3:43 PM CST -----  Contact: Patient   Patient needed to schedule her Spect test for sometime in February    Callback: [phone]      Thank you

## 2019-01-29 NOTE — TELEPHONE ENCOUNTER
Spoke to the patient instructed on date and time of test and patient can not come this early. Scheduling phone number given to patient to reschedule at a convenient time.

## 2019-02-13 ENCOUNTER — TELEPHONE (OUTPATIENT)
Dept: NEUROSURGERY | Facility: CLINIC | Age: 54
End: 2019-02-13

## 2019-02-13 NOTE — TELEPHONE ENCOUNTER
----- Message from Simona Serg sent at 2/13/2019 12:17 PM CST -----  Contact: pt at [phone] cell  Needs Advice    Reason for call:  Pt states that she  Has been  Approved to have a Fusion Spec test/CT .  Aprroved dates are Feb. 4-March 06, 2019          Communication Preference:call pt to schedule    Additional Information:

## 2019-02-21 ENCOUNTER — HOSPITAL ENCOUNTER (OUTPATIENT)
Dept: RADIOLOGY | Facility: HOSPITAL | Age: 54
Discharge: HOME | End: 2019-02-21
Attending: NEUROLOGICAL SURGERY
Payer: COMMERCIAL

## 2019-02-21 DIAGNOSIS — S12.9XXA PSEUDOARTHROSIS OF CERVICAL SPINE, INITIAL ENCOUNTER: ICD-10-CM

## 2019-02-21 PROCEDURE — A9503 TC99M MEDRONATE: HCPCS

## 2019-02-21 PROCEDURE — 78999 NM FUSION SPECT CT: ICD-10-PCS | Mod: 26,,, | Performed by: RADIOLOGY

## 2019-02-21 PROCEDURE — 78999 UNLISTED MISC PX DX NUC MED: CPT | Mod: 26,,, | Performed by: RADIOLOGY

## 2019-02-25 ENCOUNTER — TELEPHONE (OUTPATIENT)
Dept: NEUROSURGERY | Facility: CLINIC | Age: 54
End: 2019-02-25

## 2019-02-25 NOTE — TELEPHONE ENCOUNTER
----- Message from Ayaan Bean MD sent at 2/25/2019  8:20 AM CST -----  Please have her come in clinic to discuss results of her last imaging.

## 2019-03-12 ENCOUNTER — OFFICE VISIT (OUTPATIENT)
Dept: NEUROSURGERY | Facility: CLINIC | Age: 54
End: 2019-03-12
Payer: COMMERCIAL

## 2019-03-12 VITALS
DIASTOLIC BLOOD PRESSURE: 90 MMHG | BODY MASS INDEX: 32.48 KG/M2 | HEIGHT: 56 IN | SYSTOLIC BLOOD PRESSURE: 148 MMHG | WEIGHT: 144.38 LBS

## 2019-03-12 DIAGNOSIS — S12.9XXD PSEUDOARTHROSIS OF CERVICAL SPINE, SUBSEQUENT ENCOUNTER: Primary | ICD-10-CM

## 2019-03-12 DIAGNOSIS — Z98.1 S/P CERVICAL SPINAL FUSION: ICD-10-CM

## 2019-03-12 PROCEDURE — 3008F BODY MASS INDEX DOCD: CPT | Mod: CPTII,S$GLB,, | Performed by: NEUROLOGICAL SURGERY

## 2019-03-12 PROCEDURE — 3077F PR MOST RECENT SYSTOLIC BLOOD PRESSURE >= 140 MM HG: ICD-10-PCS | Mod: CPTII,S$GLB,, | Performed by: NEUROLOGICAL SURGERY

## 2019-03-12 PROCEDURE — 3008F PR BODY MASS INDEX (BMI) DOCUMENTED: ICD-10-PCS | Mod: CPTII,S$GLB,, | Performed by: NEUROLOGICAL SURGERY

## 2019-03-12 PROCEDURE — 3080F PR MOST RECENT DIASTOLIC BLOOD PRESSURE >= 90 MM HG: ICD-10-PCS | Mod: CPTII,S$GLB,, | Performed by: NEUROLOGICAL SURGERY

## 2019-03-12 PROCEDURE — 3080F DIAST BP >= 90 MM HG: CPT | Mod: CPTII,S$GLB,, | Performed by: NEUROLOGICAL SURGERY

## 2019-03-12 PROCEDURE — 99999 PR PBB SHADOW E&M-EST. PATIENT-LVL IV: ICD-10-PCS | Mod: PBBFAC,,, | Performed by: NEUROLOGICAL SURGERY

## 2019-03-12 PROCEDURE — 99214 PR OFFICE/OUTPT VISIT, EST, LEVL IV, 30-39 MIN: ICD-10-PCS | Mod: S$GLB,,, | Performed by: NEUROLOGICAL SURGERY

## 2019-03-12 PROCEDURE — 99999 PR PBB SHADOW E&M-EST. PATIENT-LVL IV: CPT | Mod: PBBFAC,,, | Performed by: NEUROLOGICAL SURGERY

## 2019-03-12 PROCEDURE — 3077F SYST BP >= 140 MM HG: CPT | Mod: CPTII,S$GLB,, | Performed by: NEUROLOGICAL SURGERY

## 2019-03-12 PROCEDURE — 99214 OFFICE O/P EST MOD 30 MIN: CPT | Mod: S$GLB,,, | Performed by: NEUROLOGICAL SURGERY

## 2019-03-12 NOTE — PROGRESS NOTES
NEUROSURGICAL PROGRESS NOTE    DATE OF SERVICE:  03/12/2019    ATTENDING PHYSICIAN:  Ayaan Bean MD    SUBJECTIVE:    INTERIM HISTORY:    This is a very pleasant 53 y.o. female, who is status post C4-6 ACDF in 2015 and then extension to C6/7 in 2016 presents in fu.  She has continued neck pain that radiates into bilateral shoulder blades and up into back of head.  Movement, bending forward and reading exacerbate the pain.  Nothing alleviates the pain aside from nusynta.  She recently was put on steroids for acute exacerbation of muscle spasms in her neck and states she has had some mild improvement in spasms.  She has bilateral UE parasthesias that have also improved with nusynta.  She denies hand weakness, dropping items, or gait instability.  At last clinic appt it was recommended to do PT which she states wasn't coordinated for her.  She occasionally does deep neck flexor exercises.         Neck Disability  Neck Disability-Pain Score: 9  Neck Disability-Pain Intensity: The pain is very severe at the moment  Neck Disability-Personal Care: It is painful to look after myself and I am slow and careful  Neck Disability-Lifting: I cannot lift or carry anything at all  Neck Disability-Reading: I can hardly read at all, because of severe pain in my neck  Neck Disability-Headaches: I have headaches almost all of the time  Neck Disability-Concentration: I have a great deal of difficulty in concentrating when I want to  Neck Disability-work: I cannot do any work at all  Neck Disability-Driving: I cant drive my car as long as I want because od moderate pain in my neck  Neck Disability-Sleeping: My sleep is completely disturbed (5-7 hours sleeplessness)  Neck Disability-Recreation: I cant do any recreation activities at all    PAST MEDICAL HISTORY:  Active Ambulatory Problems     Diagnosis Date Noted    HTN (hypertension) 08/13/2014    Renal insufficiency 08/13/2014    SOB (shortness of breath) 08/13/2014    Renal  stones 2014    Spinal stenosis of cervical region 2015    Cervical disc disorder with radiculopathy of mid-cervical region 2016    Cervical disc disorder at C6-C7 level with radiculopathy 2016     Resolved Ambulatory Problems     Diagnosis Date Noted    No Resolved Ambulatory Problems     Past Medical History:   Diagnosis Date    Anxiety     Arthritis     Bronchitis     Depression     Fibromyalgia     Hypertension     Kidney stones     Migraine     Recurrent UTI     S/P Botox injection        PAST SURGICAL HISTORY:  Past Surgical History:   Procedure Laterality Date    CERVICAL FUSION       SECTION  , ,     CYSTOSCOPY W/ URETERAL STENT PLACEMENT Right     10 times    FUSION CERVICAL ANTERIOR C6-C7, DISCECTOMY C6-C7 N/A 2016    [facility]    FUSION CERVICAL ANTERIOR WITH NAVIGATION-(ACDF)- C4-5  C5-6 N/A 2015    [facility] OR    HARVESTING-BONE GRAFT N/A 2015    [facility]    HARVESTING-BONE GRAFT - iliac crest Right 2016    [facility]    LITHOTRIPSY Right 2012 to present    right side 9 times    NEPHRECTOMY Right 2014    Robotic assisted    pain pump  2007    removed     TONSILLECTOMY      URETER SURGERY Right 3/22/2013 & 2014    Robotic both surgeries       SOCIAL HISTORY:   Social History     Socioeconomic History    Marital status:      Spouse name: Not on file    Number of children: Not on file    Years of education: Not on file    Highest education level: Not on file   Social Needs    Financial resource strain: Not on file    Food insecurity - worry: Not on file    Food insecurity - inability: Not on file    Transportation needs - medical: Not on file    Transportation needs - non-medical: Not on file   Occupational History    Not on file   Tobacco  Use    Smoking status: Never Smoker    Smokeless tobacco: Never Used   Substance and Sexual Activity    Alcohol use: No    Drug use: No    Sexual activity: Not on file   Other Topics Concern    Not on file   Social History Narrative    Not on file       FAMILY HISTORY:  Family History   Problem Relation Age of Onset    Hyperlipidemia Mother     Diabetes Father     Heart disease Father     Hypertension Father     Kidney disease Father     Hyperlipidemia Brother        CURRENTS MEDICATIONS:  Current Outpatient Medications on File Prior to Visit   Medication Sig Dispense Refill    alprazolam (XANAX) 1 MG tablet Take 1 mg by mouth 3 (three) times daily as needed for Anxiety.   2    baclofen (LIORESAL) 10 MG tablet Take 1 tablet (10 mg total) by mouth 3 (three) times daily. Take 1 tablet by mouth 3 times daily for 3 days. 9 tablet 0    butalbital-acetaminophen-caffeine -40 mg (FIORICET, ESGIC) -40 mg per tablet Take 1 tablet by mouth every 6 (six) hours as needed.      COCONUT OIL (COCONUT MISC) 1 tablet by Misc.(Non-Drug; Combo Route) route once daily.      cpm-phenyleph-acetaminophen 4- mg Tab Take 1 tablet by mouth 3 (three) times daily as needed.      cyclobenzaprine (FLEXERIL) 10 MG tablet Take 10 mg by mouth 2 (two) times daily.   1    dicyclomine (BENTYL) 10 MG capsule   4    diphenoxylate-atropine 2.5-0.025 mg (LOMOTIL) 2.5-0.025 mg per tablet   1    fluticasone (FLONASE) 50 mcg/actuation nasal spray   4    hydrocodone-acetaminophen 10-325mg (NORCO)  mg Tab Take 1 tablet by mouth every 6 (six) hours as needed.       HYDROCORT/MIN OIL/PETROLAT,WHT (HYDROCORTISONE-MIN OIL-WHT PET) 1 % Oint APPLY SMALL AMOUNT (1-2 GRAMS) TO AFFECTED AREA 2-4 TIMES DAILY AS DIRECTED FOR REDNESS RELIEF.  0    hydrOXYzine (ATARAX) 50 MG tablet Take 1 tablet (50 mg total) by mouth 3 (three) times daily. Take 1 tablet by mouth 3 times daily for 3 days. 9 tablet 0    lactobacillus  acidophilus (ACIDOPHILUS) Cap Take 1 capsule by mouth once daily.      lidocaine-prilocaine (EMLA) cream APPLY A SMALL AMOUNT (2-3GRAMS) TOPICALLY TO AFFECTED AREA  3 TIMES A DAY AS DIRECTED.  0    ondansetron (ZOFRAN-ODT) 8 MG TbDL DISSOLVE 1 TABLET BY MOUTH EVERY 12 HOURS AS NEEDED 20 tablet 1    potassium chloride SA (K-DUR,KLOR-CON) 20 MEQ tablet   4    promethazine (PHENERGAN) 25 MG tablet   1    ranitidine (ZANTAC) 150 MG capsule Take 1 capsule (150 mg total) by mouth 2 (two) times daily. 60 capsule 11    RELISTOR 150 mg Tab   4    rizatriptan (MAXALT) 10 MG tablet 1 tab PO PRN migraine. May repeat every 2 hours for max 3 tabs in 24 hours. Use no more than 10 days per month. 18 tablet 3    temazepam (RESTORIL) 22.5 MG capsule Take 22.5 mg by mouth nightly as needed for Insomnia.      tiZANidine (ZANAFLEX) 4 MG tablet TAKE 1 OR 1 & 1/2 TABLET(S) BY MOUTH AT NIGHT AS NEEDED FOR MUSCLE SPASM 45 tablet 3     No current facility-administered medications on file prior to visit.        ALLERGIES:  Review of patient's allergies indicates:   Allergen Reactions    Adhesive Hives and Rash    Antihistamines - alkylamine Rash     hives    Demerol [meperidine] Other (See Comments)     Weakness, lethargy    Levaquin [levofloxacin] Hives    Macrobid [nitrofurantoin monohyd/m-cryst] Hives    Macrodantin [nitrofurantoin macrocrystalline]     Morphine Itching     Pill form    Penicillins Rash    Sulfa (sulfonamide antibiotics) Rash       REVIEW OF SYSTEMS:  Review of Systems   Constitutional: Negative for diaphoresis, fever and weight loss.   Respiratory: Negative for shortness of breath.    Cardiovascular: Negative for chest pain.   Gastrointestinal: Negative for blood in stool.   Genitourinary: Negative for hematuria.   Endo/Heme/Allergies: Does not bruise/bleed easily.   All other systems reviewed and are negative.        OBJECTIVE:    PHYSICAL EXAMINATION:   There were no vitals filed for this  visit.    Physical Exam:  Vitals reviewed.    Constitutional: She appears well-developed and well-nourished.     Eyes: Pupils are equal, round, and reactive to light. Conjunctivae and EOM are normal.     Cardiovascular: Normal distal pulses and no edema.     Abdominal: Soft.     Skin: Skin displays no rash on trunk and no rash on extremities. Skin displays no lesions on trunk and no lesions on extremities.     Psych/Behavior: She is alert. She is oriented to person, place, and time. She has a normal mood and affect.     Musculoskeletal:        Neck: Range of motion is limited.     Neurological:        Coordination: She has a normal Romberg Test.        DTRs: Tricep reflexes are 2+ on the right side and 2+ on the left side. Bicep reflexes are 2+ on the right side and 2+ on the left side. Brachioradialis reflexes are 2+ on the right side and 2+ on the left side. Patellar reflexes are 2+ on the right side and 2+ on the left side. Achilles reflexes are 2+ on the right side and 2+ on the left side.       Back Exam     Tenderness   The patient is experiencing tenderness in the cervical.    Range of Motion   Extension: normal   Flexion: normal   Lateral bend right: normal   Lateral bend left: normal   Rotation right: normal   Rotation left: normal     Muscle Strength   Right Quadriceps:  5/5   Left Quadriceps:  5/5   Right Hamstrings:  5/5   Left Hamstrings:  5/5     Tests   Straight leg raise right: negative  Straight leg raise left: negative    Other   Toe walk: normal  Heel walk: normal            SI joint:   Palpation at the right and left SI joints not painful  BINU test is negative bilaterally  Gaenslen test is negative bilaterally  Thigh thrust test is negative bilaterally    Neurologic Exam     Mental Status   Oriented to person, place, and time.   Speech: speech is normal   Level of consciousness: alert    Cranial Nerves   Cranial nerves II through XII intact.     CN III, IV, VI   Pupils are equal, round, and  reactive to light.  Extraocular motions are normal.     Motor Exam   Muscle bulk: normal  Overall muscle tone: normal    Strength   Right deltoid: 5/5  Left deltoid: 5/5  Right biceps: 5/5  Left biceps: 5/5  Right triceps: 5/5  Left triceps: 5/5  Right wrist flexion: 5/5  Left wrist flexion: 5/5  Right wrist extension: 5/5  Left wrist extension: 5/5  Right interossei: 5/5  Left interossei: 5/5  Right iliopsoas: 5/5  Left iliopsoas: 5/5  Right quadriceps: 5/5  Left quadriceps: 5/5  Right hamstrin/5  Left hamstrin/5  Right anterior tibial: 5/5  Left anterior tibial: 5/5  Right posterior tibial: 5/5  Left posterior tibial: 5/5  Right peroneal: 5/5  Left peroneal: 5/5  Right gastroc: 5/5  Left gastroc: 5/5    Sensory Exam   Light touch normal.   Pinprick normal.     Gait, Coordination, and Reflexes     Gait  Gait: normal    Coordination   Romberg: negative  Finger to nose coordination: normal  Tandem walking coordination: normal    Reflexes   Right brachioradialis: 2+  Left brachioradialis: 2+  Right biceps: 2+  Left biceps: 2+  Right triceps: 2+  Left triceps: 2+  Right patellar: 2+  Left patellar: 2+  Right achilles: 2+  Left achilles: 2+  Right plantar: normal  Left plantar: normal  Right Tyson: absent  Left Tyson: absent  Right ankle clonus: absent  Left ankle clonus: absent        DIAGNOSTIC DATA:  NM spect scan demonstrates focal uptake at C6/7  Ct scan cervical spine shows loosening of C7 screws  Cervical XR flexion-extension shows movement at C6-7    ASSESMENT:  This is a 53 y.o. female with s/p C4-6 ACDF in 2015 and extension of fusion to C6/7 in 2016 with continued neck pain.      Problem List Items Addressed This Visit     None      Visit Diagnoses     Pseudoarthrosis of cervical spine, subsequent encounter    -  Primary    S/P cervical spinal fusion                PLAN:  I explained the natural history of the disease and all treatment options. I recommended a C4 to T1 laminectomy and posterior  instrumented fusion to treat the pseudoarthrosis and help with neck pain.     We have discussed the risks of surgery including bleeding, infection, failure of surgery, CSF leak, nerve root injury, spinal cord injury, weakness, paralysis, peripheral neuropathy, malplaced hardware, migration of hardware, non-union, need for reoperation. Patient understands the risks and would like to proceed with surgery.    Compound cream for myofascial pain    The patient has increase perioperative risks because of these comorbidities: essential hypertension.         Ayaan Bean MD  Cell:[phone]

## 2019-03-21 ENCOUNTER — TELEPHONE (OUTPATIENT)
Dept: NEUROSURGERY | Facility: CLINIC | Age: 54
End: 2019-03-21

## 2019-03-21 DIAGNOSIS — Z98.1 S/P CERVICAL SPINAL FUSION: Primary | ICD-10-CM

## 2019-03-21 DIAGNOSIS — S12.9XXA PSEUDOARTHROSIS OF CERVICAL SPINE, INITIAL ENCOUNTER: ICD-10-CM

## 2019-03-29 ENCOUNTER — TELEPHONE (OUTPATIENT)
Dept: NEUROLOGY | Facility: CLINIC | Age: 54
End: 2019-03-29

## 2019-04-15 ENCOUNTER — TELEPHONE (OUTPATIENT)
Dept: NEUROSURGERY | Facility: CLINIC | Age: 54
End: 2019-04-15

## 2019-04-15 NOTE — TELEPHONE ENCOUNTER
----- Message from Brooke Wiley sent at 4/15/2019 11:15 AM CDT -----  Contact: [phone]/self  Patient requesting to speak with you concerning her pre-op.  Please call back to assist at [phone].

## 2019-04-17 ENCOUNTER — TELEPHONE (OUTPATIENT)
Dept: INFECTIOUS DISEASES | Facility: CLINIC | Age: 54
End: 2019-04-17

## 2019-04-17 NOTE — TELEPHONE ENCOUNTER
----- Message from Hortencia Schaefer LPN sent at 4/17/2019  3:34 PM CDT -----  No I have not received it  ----- Message -----  From: Alessandro Spencer MA  Sent: 4/17/2019   2:54 PM  To: MARYANN Coronelgunner Guera,    Did you get a fax re: surgery clearance?      ----- Message -----  From: Kath Alvarez  Sent: 4/17/2019   2:05 PM  To: Vikas Kuo Staff    No. [phone]       Patient's surgery clearance was faxed earlier this morning.   Did you receive it.

## 2019-04-25 ENCOUNTER — HOSPITAL ENCOUNTER (OUTPATIENT)
Dept: PREADMISSION TESTING | Facility: HOSPITAL | Age: 54
Discharge: HOME | DRG: 472 | End: 2019-04-25
Attending: NEUROLOGICAL SURGERY
Payer: COMMERCIAL

## 2019-04-25 ENCOUNTER — ANESTHESIA EVENT (OUTPATIENT)
Dept: SURGERY | Facility: HOSPITAL | Age: 54
DRG: 472 | End: 2019-04-25
Payer: COMMERCIAL

## 2019-04-25 DIAGNOSIS — M50.123 CERVICAL DISC DISORDER AT C6-C7 LEVEL WITH RADICULOPATHY: Primary | ICD-10-CM

## 2019-04-25 RX ORDER — CLONAZEPAM 1 MG/1
1 TABLET ORAL 2 TIMES DAILY PRN
COMMUNITY

## 2019-04-25 RX ORDER — SODIUM CHLORIDE, SODIUM LACTATE, POTASSIUM CHLORIDE, CALCIUM CHLORIDE 600; 310; 30; 20 MG/100ML; MG/100ML; MG/100ML; MG/100ML
INJECTION, SOLUTION INTRAVENOUS CONTINUOUS
Status: CANCELLED | OUTPATIENT
Start: 2019-04-25

## 2019-04-25 RX ORDER — LIDOCAINE HYDROCHLORIDE 10 MG/ML
1 INJECTION, SOLUTION EPIDURAL; INFILTRATION; INTRACAUDAL; PERINEURAL ONCE
Status: CANCELLED | OUTPATIENT
Start: 2019-04-25 | End: 2019-04-25

## 2019-04-25 NOTE — PRE-PROCEDURE INSTRUCTIONS
[facility]  [phone]    Allergies, medical, surgical, family and psychosocial histories reviewed with patient. Periop plan of care reviewed. Preop instructions given, including medications to take and to hold. Hibiclens soap and instructions on use given. Time allotted for questions to be addressed.  Patient verbalized understanding.

## 2019-04-25 NOTE — ANESTHESIA PREPROCEDURE EVALUATION
2019  Karla Bravo is a 53 y.o., female scheduled for C4-T1 laminectomy and posterior fusion on 19.    PCP optimization, labs, and EKG in Care Everywhere    Past Medical History:   Diagnosis Date    Anxiety     Arthritis     Bronchitis     related to sinuses    Depression     Fibromyalgia     Hypertension     said takes BP med PRN    Kidney stones     right side    Migraine     Recurrent UTI     S/P Botox injection     16 for Migraines     Past Surgical History:   Procedure Laterality Date    CERVICAL FUSION       SECTION  , ,     CYSTOSCOPY W/ URETERAL STENT PLACEMENT Right     10 times    FUSION CERVICAL ANTERIOR C6-C7, DISCECTOMY C6-C7 N/A 2016    [facility]    FUSION CERVICAL ANTERIOR WITH NAVIGATION-(ACDF)- C4-5  C5-6 N/A 2015    [facility]    HARVESTING-BONE GRAFT N/A 2015    [facility]    HARVESTING-BONE GRAFT - iliac crest Right 2016    [facility]    LITHOTRIPSY Right 2012 to present    right side 9 times    NEPHRECTOMY Right 2014    Robotic assisted    pain pump  2007    removed     TONSILLECTOMY      URETER SURGERY Right 3/22/2013 & 2014    Robotic both surgeries    WRIST FRACTURE SURGERY         Anesthesia Evaluation    I have reviewed the Patient Summary Reports.     I have reviewed the Medications.     Review of Systems  Anesthesia Hx:  No problems with previous Anesthesia  History of prior surgery of interest to airway management or planning: cervical fusion, nephrectomy. Denies Family Hx of Anesthesia complications.    Social:  Non-Smoker, No Alcohol Use    Hematology/Oncology:  Hematology Normal        Cardiovascular:  Cardiovascular Normal   Denies  Angina.     ECG has been reviewed.    Pulmonary:  Pulmonary Normal  Denies Shortness of breath.    Renal/:  Kidney Function/Disease, Single Kidney    Hepatic/GI:  Hepatic/GI Normal    Musculoskeletal:  Cervical Spine Disorder, S/P Cervical Fusion    Neurological:  Dx of Headaches, Migraine Headache   Endocrine:  Endocrine Normal    Psych:   anxiety depression          Physical Exam   Airway/Jaw/Neck:  Airway Findings: Mallampati: III TM Distance: 4 - 6 cm  Jaw/Neck Findings:  Neck ROM: Extension Decreased, Mild       Chest/Lungs:  Chest/Lungs Findings: Clear to auscultation, Normal Respiratory Rate     Heart/Vascular:  Heart Findings: Rate: Normal  Rhythm: Regular Rhythm  Sounds: Normal  Heart murmur: negative       Mental Status:  Mental Status Findings:  Cooperative, Alert and Oriented         Anesthesia Plan  Type of Anesthesia, risks & benefits discussed:  Anesthesia Type:  general  Patient's Preference:   Intra-op Monitoring Plan:   Intra-op Monitoring Plan Comments:   Post Op Pain Control Plan:   Post Op Pain Control Plan Comments:   Induction:   IV  Beta Blocker:         Informed Consent: Patient understands risks and agrees with Anesthesia plan.  Questions answered.   ASA Score: 2     Day of Surgery Review of History & Physical:        Anesthesia Plan Notes: Anesthesia consent to be signed prior to procedure on 4/26/19  PCP optimization, labs, and EKG in Care Everywhere          Ready For Surgery From Anesthesia Perspective.

## 2019-04-25 NOTE — DISCHARGE INSTRUCTIONS
Your surgery is scheduled for 4/26/19.    Please report to Outpatient Surgery Intake Office on the 2nd FLOOR at 5:30 a.m.          INSTRUCTIONS IMPORTANT!!!  ¨ Do not eat or drink after 12 midnight-including water. OK to brush teeth, no   gum, candy or mints!      ____  Proceed to [facility] on 4/25/19 for additional blood test.        ____  Do not wear makeup, including mascara.  ____  No powder, lotions or creams to surgical area.  ____  Please remove all jewelry, including piercings and leave at home.  ____  No money or valuables needed. Please leave at home.  ____  Please bring any documents given by your doctor.  ____  If going home the same day, arrange for a ride home. You will not be able to             drive if Anesthesia was used.  ____  Wear loose fitting clothing. Allow for dressings, bandages.  ____  Stop Aspirin, Ibuprofen, Motrin and Aleve at least 3-5 days before surgery, unless otherwise instructed by your doctor, or the nurse.   You MAY use Tylenol/acetaminophen until day of surgery.  ____  Wash the surgical area with Hibiclens the night before surgery, and again the             morning of surgery.  Be sure to rinse hibiclens off completely (if instructed by   nurse).  ____  If you take diabetic medication, do not take am of surgery unless instructed by Doctor.  ____  Call MD for temperature above 101 degrees or any other signs of infection such as Urinary (bladder) infection, Upper respiratory infection, skin boils, etc.  ____ Stop taking any Fish Oil supplement or any Vitamins that contain Vitamin E at least 5 days prior to surgery.  ____ Do Not wear your contact lenses the day of your procedure.  You may wear your glasses.      ____Do not shave surgical site for 3 days prior to surgery.  ____ Practice Good hand washing before, during, and after procedure.      I have read or had read and explained to me, and understand the above information.  Additional comments or  instructions:  For additional questions call [phone]      ANESTHESIA SIDE EFFECTS  -For the first 24 hours after surgery:  Do not drive, use heavy equipment, make important decisions, or drink alcohol  -It is normal to feel sleepy for several hours.  Rest until you are more awake.  -Have someone stay with you, if needed.  They can watch for problems and help keep you safe.  -Some possible post anesthesia side effects include: nausea and vomiting, sore throat and hoarseness, sleepiness, and dizziness.        Pre-Op Bathing Instructions    Before surgery, you can play an important role in your own health.    Because skin is not sterile, we need to be sure that your skin is as free of germs as possible. By following the instructions below, you can reduce the number of germs on your skin before surgery.    IMPORTANT: You will need to shower with a special soap called Hibiclens*, available at any pharmacy.  If you are allergic to Chlorhexidine (the antiseptic in Hibiclens), use an antibacterial soap such as Dial Soap for your preoperative shower.  You will shower with Hibiclens both the night before your surgery and the morning of your surgery.  Do not use Hibiclens on the head, face or genitals to avoid injury to those areas.    STEP #1: THE NIGHT BEFORE YOUR SURGERY     1. Do not shave the area of your body where your surgery will be performed.  2. Shower and wash your hair and body as usual with your normal soap and shampoo.  3. Rinse your hair and body thoroughly after you shower to remove all soap residue.  4. With your hand, apply one packet of Hibiclens soap to the surgical site.   5. Wash the site gently for five (5) minutes. Do not scrub your skin too hard.   6. Do not wash with your regular soap after Hibiclens is used.  7. Rinse your body thoroughly.  8. Pat yourself dry with a clean, soft towel.  9. Do not use lotion, cream, or powder.  10. Wear clean clothes.    STEP #2: THE MORNING OF YOUR SURGERY      1. Repeat Step #1.    * Not to be used by people allergic to Chlorhexidine.

## 2019-04-26 ENCOUNTER — HOSPITAL ENCOUNTER (INPATIENT)
Facility: HOSPITAL | Age: 54
LOS: 1 days | Discharge: HOME HEALTH | DRG: 472 | End: 2019-04-27
Attending: NEUROLOGICAL SURGERY | Admitting: NEUROLOGICAL SURGERY
Payer: COMMERCIAL

## 2019-04-26 ENCOUNTER — ANESTHESIA (OUTPATIENT)
Dept: SURGERY | Facility: HOSPITAL | Age: 54
DRG: 472 | End: 2019-04-26
Payer: COMMERCIAL

## 2019-04-26 DIAGNOSIS — S12.9XXA PSEUDOARTHROSIS OF CERVICAL SPINE: ICD-10-CM

## 2019-04-26 LAB
ANION GAP SERPL CALC-SCNC: 6 MMOL/L (ref 8–16)
BASOPHILS # BLD AUTO: 0.01 K/UL (ref 0–0.2)
BASOPHILS NFR BLD: 0.4 % (ref 0–1.9)
BUN SERPL-MCNC: 15 MG/DL (ref 6–20)
CALCIUM SERPL-MCNC: 9.8 MG/DL (ref 8.7–10.5)
CHLORIDE SERPL-SCNC: 106 MMOL/L (ref 95–110)
CO2 SERPL-SCNC: 28 MMOL/L (ref 23–29)
CREAT SERPL-MCNC: 1.3 MG/DL (ref 0.5–1.4)
DIFFERENTIAL METHOD: ABNORMAL
EOSINOPHIL # BLD AUTO: 0.1 K/UL (ref 0–0.5)
EOSINOPHIL NFR BLD: 3.1 % (ref 0–8)
ERYTHROCYTE [DISTWIDTH] IN BLOOD BY AUTOMATED COUNT: 12.2 % (ref 11.5–14.5)
EST. GFR  (AFRICAN AMERICAN): 55 ML/MIN/1.73 M^2
EST. GFR  (NON AFRICAN AMERICAN): 48 ML/MIN/1.73 M^2
GLUCOSE SERPL-MCNC: 106 MG/DL (ref 70–110)
GLUCOSE SERPL-MCNC: 110 MG/DL (ref 70–110)
HCO3 UR-SCNC: 24.2 MMOL/L (ref 24–28)
HCT VFR BLD AUTO: 30.4 % (ref 37–48.5)
HCT VFR BLD CALC: 25 %PCV (ref 36–54)
HGB BLD-MCNC: 10.1 G/DL (ref 12–16)
HGB BLD-MCNC: 9 G/DL
LYMPHOCYTES # BLD AUTO: 1.3 K/UL (ref 1–4.8)
LYMPHOCYTES NFR BLD: 51 % (ref 18–48)
MCH RBC QN AUTO: 28.9 PG (ref 27–31)
MCHC RBC AUTO-ENTMCNC: 33.2 G/DL (ref 32–36)
MCV RBC AUTO: 87 FL (ref 82–98)
MONOCYTES # BLD AUTO: 0.2 K/UL (ref 0.3–1)
MONOCYTES NFR BLD: 7.5 % (ref 4–15)
NEUTROPHILS # BLD AUTO: 1 K/UL (ref 1.8–7.7)
NEUTROPHILS NFR BLD: 37.6 % (ref 38–73)
PCO2 BLDA: 39.3 MMHG (ref 35–45)
PH SMN: 7.4 [PH] (ref 7.35–7.45)
PLATELET # BLD AUTO: 215 K/UL (ref 150–350)
PMV BLD AUTO: 9.1 FL (ref 9.2–12.9)
PO2 BLDA: 455 MMHG (ref 80–100)
POC BE: -1 MMOL/L
POC IONIZED CALCIUM: 1.15 MMOL/L (ref 1.06–1.42)
POC SATURATED O2: 100 % (ref 95–100)
POC TCO2: 25 MMOL/L (ref 23–27)
POTASSIUM BLD-SCNC: 3.9 MMOL/L (ref 3.5–5.1)
POTASSIUM SERPL-SCNC: 4.4 MMOL/L (ref 3.5–5.1)
RBC # BLD AUTO: 3.49 M/UL (ref 4–5.4)
SAMPLE: ABNORMAL
SODIUM BLD-SCNC: 143 MMOL/L (ref 136–145)
SODIUM SERPL-SCNC: 140 MMOL/L (ref 136–145)
WBC # BLD AUTO: 2.55 K/UL (ref 3.9–12.7)

## 2019-04-26 PROCEDURE — 94799 UNLISTED PULMONARY SVC/PX: CPT

## 2019-04-26 PROCEDURE — 63600175 PHARM REV CODE 636 W HCPCS: Performed by: ANESTHESIOLOGY

## 2019-04-26 PROCEDURE — 37000009 HC ANESTHESIA EA ADD 15 MINS: Performed by: NEUROLOGICAL SURGERY

## 2019-04-26 PROCEDURE — 71000033 HC RECOVERY, INTIAL HOUR: Performed by: NEUROLOGICAL SURGERY

## 2019-04-26 PROCEDURE — C1729 CATH, DRAINAGE: HCPCS | Performed by: NEUROLOGICAL SURGERY

## 2019-04-26 PROCEDURE — 11000001 HC ACUTE MED/SURG PRIVATE ROOM

## 2019-04-26 PROCEDURE — 63600175 PHARM REV CODE 636 W HCPCS

## 2019-04-26 PROCEDURE — 36415 COLL VENOUS BLD VENIPUNCTURE: CPT

## 2019-04-26 PROCEDURE — 85025 COMPLETE CBC W/AUTO DIFF WBC: CPT

## 2019-04-26 PROCEDURE — 37000008 HC ANESTHESIA 1ST 15 MINUTES: Performed by: NEUROLOGICAL SURGERY

## 2019-04-26 PROCEDURE — 25000003 PHARM REV CODE 250: Performed by: NEUROLOGICAL SURGERY

## 2019-04-26 PROCEDURE — 20930 SP BONE ALGRFT MORSEL ADD-ON: CPT | Mod: ,,, | Performed by: NEUROLOGICAL SURGERY

## 2019-04-26 PROCEDURE — P9047 ALBUMIN (HUMAN), 25%, 50ML: HCPCS | Mod: JG | Performed by: NURSE ANESTHETIST, CERTIFIED REGISTERED

## 2019-04-26 PROCEDURE — 27800903 OPTIME MED/SURG SUP & DEVICES OTHER IMPLANTS: Performed by: NEUROLOGICAL SURGERY

## 2019-04-26 PROCEDURE — 20936 SP BONE AGRFT LOCAL ADD-ON: CPT | Mod: ,,, | Performed by: NEUROLOGICAL SURGERY

## 2019-04-26 PROCEDURE — 25000003 PHARM REV CODE 250: Performed by: PHYSICIAN ASSISTANT

## 2019-04-26 PROCEDURE — 22614 PR ARTHRODESIS, POST/POSTLAT, SNGL INTERSPACE, EA ADDTL: ICD-10-PCS | Mod: ,,, | Performed by: NEUROLOGICAL SURGERY

## 2019-04-26 PROCEDURE — 63600175 PHARM REV CODE 636 W HCPCS: Performed by: NEUROLOGICAL SURGERY

## 2019-04-26 PROCEDURE — 25000003 PHARM REV CODE 250: Performed by: ANESTHESIOLOGY

## 2019-04-26 PROCEDURE — 71000039 HC RECOVERY, EACH ADD'L HOUR: Performed by: NEUROLOGICAL SURGERY

## 2019-04-26 PROCEDURE — 22614 ARTHRD PST TQ 1NTRSPC EA ADD: CPT | Mod: ,,, | Performed by: NEUROLOGICAL SURGERY

## 2019-04-26 PROCEDURE — 36000711: Performed by: NEUROLOGICAL SURGERY

## 2019-04-26 PROCEDURE — 80048 BASIC METABOLIC PNL TOTAL CA: CPT

## 2019-04-26 PROCEDURE — 22600 ARTHRD PST TQ 1NTRSPC CRV: CPT | Mod: 51,,, | Performed by: NEUROLOGICAL SURGERY

## 2019-04-26 PROCEDURE — 20936 PR AUTOGRAFT SPINE SURGERY LOCAL FROM SAME INCISION: ICD-10-PCS | Mod: ,,, | Performed by: NEUROLOGICAL SURGERY

## 2019-04-26 PROCEDURE — 63015 REMOVE SPINE LAMINA >2 CRVCL: CPT | Mod: ,,, | Performed by: NEUROLOGICAL SURGERY

## 2019-04-26 PROCEDURE — C1713 ANCHOR/SCREW BN/BN,TIS/BN: HCPCS | Performed by: NEUROLOGICAL SURGERY

## 2019-04-26 PROCEDURE — 25000003 PHARM REV CODE 250: Performed by: NURSE PRACTITIONER

## 2019-04-26 PROCEDURE — 22842 INSERT SPINE FIXATION DEVICE: CPT | Mod: ,,, | Performed by: NEUROLOGICAL SURGERY

## 2019-04-26 PROCEDURE — 25000003 PHARM REV CODE 250: Performed by: NURSE ANESTHETIST, CERTIFIED REGISTERED

## 2019-04-26 PROCEDURE — 94761 N-INVAS EAR/PLS OXIMETRY MLT: CPT

## 2019-04-26 PROCEDURE — 22842 PR POSTERIOR SEGMENTAL INSTRUMENTATION 3-6 VRT SEG: ICD-10-PCS | Mod: ,,, | Performed by: NEUROLOGICAL SURGERY

## 2019-04-26 PROCEDURE — 22600 PR ARTHRODESIS, POST/POSTLAT, SNGL INTERSPACE, CERVICAL BELOW C2: ICD-10-PCS | Mod: 51,,, | Performed by: NEUROLOGICAL SURGERY

## 2019-04-26 PROCEDURE — 27201423 OPTIME MED/SURG SUP & DEVICES STERILE SUPPLY: Performed by: NEUROLOGICAL SURGERY

## 2019-04-26 PROCEDURE — 20930 PR ALLOGRAFT FOR SPINE SURGERY ONLY MORSELIZED: ICD-10-PCS | Mod: ,,, | Performed by: NEUROLOGICAL SURGERY

## 2019-04-26 PROCEDURE — 36000710: Performed by: NEUROLOGICAL SURGERY

## 2019-04-26 PROCEDURE — 63015 PR LAMINECTOMY,>2 SGMT,CERVICAL: ICD-10-PCS | Mod: ,,, | Performed by: NEUROLOGICAL SURGERY

## 2019-04-26 PROCEDURE — 63600175 PHARM REV CODE 636 W HCPCS: Performed by: NURSE ANESTHETIST, CERTIFIED REGISTERED

## 2019-04-26 DEVICE — CAP LOCKING THREAD QUARTEX: Type: IMPLANTABLE DEVICE | Site: SPINE CERVICAL | Status: FUNCTIONAL

## 2019-04-26 DEVICE — SCREW POLY QUARTEX 3.5X14MM: Type: IMPLANTABLE DEVICE | Site: SPINE CERVICAL | Status: FUNCTIONAL

## 2019-04-26 DEVICE — PUTTY DBX 10CC: Type: IMPLANTABLE DEVICE | Site: SPINE CERVICAL | Status: FUNCTIONAL

## 2019-04-26 RX ORDER — HYDROMORPHONE HYDROCHLORIDE 2 MG/ML
0.5 INJECTION, SOLUTION INTRAMUSCULAR; INTRAVENOUS; SUBCUTANEOUS EVERY 5 MIN PRN
Status: DISCONTINUED | OUTPATIENT
Start: 2019-04-26 | End: 2019-04-26 | Stop reason: HOSPADM

## 2019-04-26 RX ORDER — METHOCARBAMOL 750 MG/1
750 TABLET, FILM COATED ORAL 3 TIMES DAILY
Status: DISCONTINUED | OUTPATIENT
Start: 2019-04-26 | End: 2019-04-27 | Stop reason: HOSPADM

## 2019-04-26 RX ORDER — FENTANYL CITRATE 50 UG/ML
INJECTION, SOLUTION INTRAMUSCULAR; INTRAVENOUS
Status: DISCONTINUED | OUTPATIENT
Start: 2019-04-26 | End: 2019-04-26

## 2019-04-26 RX ORDER — CLONAZEPAM 0.5 MG/1
1 TABLET ORAL 2 TIMES DAILY PRN
Status: DISCONTINUED | OUTPATIENT
Start: 2019-04-26 | End: 2019-04-27 | Stop reason: HOSPADM

## 2019-04-26 RX ORDER — VANCOMYCIN HYDROCHLORIDE 1 G/20ML
INJECTION, POWDER, LYOPHILIZED, FOR SOLUTION INTRAVENOUS
Status: DISCONTINUED | OUTPATIENT
Start: 2019-04-26 | End: 2019-04-26 | Stop reason: HOSPADM

## 2019-04-26 RX ORDER — CYCLOBENZAPRINE HCL 10 MG
10 TABLET ORAL
Status: DISCONTINUED | OUTPATIENT
Start: 2019-04-26 | End: 2019-04-26

## 2019-04-26 RX ORDER — SUCCINYLCHOLINE CHLORIDE 20 MG/ML
INJECTION INTRAMUSCULAR; INTRAVENOUS
Status: DISCONTINUED | OUTPATIENT
Start: 2019-04-26 | End: 2019-04-26

## 2019-04-26 RX ORDER — PROPOFOL 10 MG/ML
VIAL (ML) INTRAVENOUS
Status: DISCONTINUED | OUTPATIENT
Start: 2019-04-26 | End: 2019-04-26

## 2019-04-26 RX ORDER — LABETALOL HYDROCHLORIDE 5 MG/ML
5 INJECTION, SOLUTION INTRAVENOUS EVERY 10 MIN PRN
Status: DISCONTINUED | OUTPATIENT
Start: 2019-04-26 | End: 2019-04-27 | Stop reason: HOSPADM

## 2019-04-26 RX ORDER — ONDANSETRON 8 MG/1
8 TABLET, ORALLY DISINTEGRATING ORAL EVERY 6 HOURS PRN
Status: DISCONTINUED | OUTPATIENT
Start: 2019-04-26 | End: 2019-04-27 | Stop reason: HOSPADM

## 2019-04-26 RX ORDER — ONDANSETRON 2 MG/ML
4 INJECTION INTRAMUSCULAR; INTRAVENOUS ONCE AS NEEDED
Status: DISCONTINUED | OUTPATIENT
Start: 2019-04-26 | End: 2019-04-26 | Stop reason: HOSPADM

## 2019-04-26 RX ORDER — BUPIVACAINE HCL/EPINEPHRINE 0.5-1:200K
VIAL (ML) INJECTION
Status: DISCONTINUED | OUTPATIENT
Start: 2019-04-26 | End: 2019-04-26 | Stop reason: HOSPADM

## 2019-04-26 RX ORDER — MIDAZOLAM HYDROCHLORIDE 1 MG/ML
INJECTION, SOLUTION INTRAMUSCULAR; INTRAVENOUS
Status: DISCONTINUED | OUTPATIENT
Start: 2019-04-26 | End: 2019-04-26

## 2019-04-26 RX ORDER — SODIUM CHLORIDE 0.9 % (FLUSH) 0.9 %
10 SYRINGE (ML) INJECTION
Status: DISCONTINUED | OUTPATIENT
Start: 2019-04-26 | End: 2019-04-27 | Stop reason: HOSPADM

## 2019-04-26 RX ORDER — KETAMINE HYDROCHLORIDE 50 MG/ML
INJECTION, SOLUTION INTRAMUSCULAR; INTRAVENOUS
Status: DISCONTINUED | OUTPATIENT
Start: 2019-04-26 | End: 2019-04-26

## 2019-04-26 RX ORDER — OXYCODONE HCL 10 MG/1
10 TABLET, FILM COATED, EXTENDED RELEASE ORAL
Status: COMPLETED | OUTPATIENT
Start: 2019-04-26 | End: 2019-04-26

## 2019-04-26 RX ORDER — HYDROCODONE BITARTRATE AND ACETAMINOPHEN 500; 5 MG/1; MG/1
TABLET ORAL
Status: DISCONTINUED | OUTPATIENT
Start: 2019-04-26 | End: 2019-04-26

## 2019-04-26 RX ORDER — MUPIROCIN 20 MG/G
1 OINTMENT TOPICAL 2 TIMES DAILY
Status: DISCONTINUED | OUTPATIENT
Start: 2019-04-26 | End: 2019-04-27 | Stop reason: HOSPADM

## 2019-04-26 RX ORDER — CELECOXIB 100 MG/1
200 CAPSULE ORAL
Status: COMPLETED | OUTPATIENT
Start: 2019-04-26 | End: 2019-04-26

## 2019-04-26 RX ORDER — OXYCODONE HYDROCHLORIDE 5 MG/1
10 TABLET ORAL EVERY 6 HOURS PRN
Status: DISCONTINUED | OUTPATIENT
Start: 2019-04-26 | End: 2019-04-27 | Stop reason: HOSPADM

## 2019-04-26 RX ORDER — PROPOFOL 10 MG/ML
VIAL (ML) INTRAVENOUS CONTINUOUS PRN
Status: DISCONTINUED | OUTPATIENT
Start: 2019-04-26 | End: 2019-04-26

## 2019-04-26 RX ORDER — GENTAMICIN SULFATE 80 MG/100ML
INJECTION, SOLUTION INTRAVENOUS
Status: DISCONTINUED | OUTPATIENT
Start: 2019-04-26 | End: 2019-04-26

## 2019-04-26 RX ORDER — ALBUMIN HUMAN 250 G/1000ML
SOLUTION INTRAVENOUS CONTINUOUS PRN
Status: DISCONTINUED | OUTPATIENT
Start: 2019-04-26 | End: 2019-04-26

## 2019-04-26 RX ORDER — DEXTROSE MONOHYDRATE, SODIUM CHLORIDE, AND POTASSIUM CHLORIDE 50; 1.49; 9 G/1000ML; G/1000ML; G/1000ML
INJECTION, SOLUTION INTRAVENOUS CONTINUOUS
Status: DISCONTINUED | OUTPATIENT
Start: 2019-04-26 | End: 2019-04-27 | Stop reason: HOSPADM

## 2019-04-26 RX ORDER — HEPARIN SODIUM 5000 [USP'U]/ML
5000 INJECTION, SOLUTION INTRAVENOUS; SUBCUTANEOUS EVERY 12 HOURS
Status: DISCONTINUED | OUTPATIENT
Start: 2019-04-26 | End: 2019-04-27 | Stop reason: HOSPADM

## 2019-04-26 RX ORDER — BUPIVACAINE HYDROCHLORIDE AND EPINEPHRINE 5; 5 MG/ML; UG/ML
30 INJECTION, SOLUTION EPIDURAL; INTRACAUDAL; PERINEURAL ONCE
Status: DISCONTINUED | OUTPATIENT
Start: 2019-04-26 | End: 2019-04-26

## 2019-04-26 RX ORDER — SODIUM CHLORIDE, SODIUM LACTATE, POTASSIUM CHLORIDE, CALCIUM CHLORIDE 600; 310; 30; 20 MG/100ML; MG/100ML; MG/100ML; MG/100ML
INJECTION, SOLUTION INTRAVENOUS CONTINUOUS
Status: DISCONTINUED | OUTPATIENT
Start: 2019-04-26 | End: 2019-04-26

## 2019-04-26 RX ORDER — CELECOXIB 100 MG/1
200 CAPSULE ORAL 2 TIMES DAILY
Status: DISCONTINUED | OUTPATIENT
Start: 2019-04-26 | End: 2019-04-27 | Stop reason: HOSPADM

## 2019-04-26 RX ORDER — BACITRACIN 50000 [IU]/1
INJECTION, POWDER, FOR SOLUTION INTRAMUSCULAR
Status: DISCONTINUED | OUTPATIENT
Start: 2019-04-26 | End: 2019-04-26 | Stop reason: HOSPADM

## 2019-04-26 RX ORDER — LIDOCAINE HCL/PF 100 MG/5ML
SYRINGE (ML) INTRAVENOUS
Status: DISCONTINUED | OUTPATIENT
Start: 2019-04-26 | End: 2019-04-26

## 2019-04-26 RX ORDER — HYDROMORPHONE HYDROCHLORIDE 2 MG/ML
2 INJECTION, SOLUTION INTRAMUSCULAR; INTRAVENOUS; SUBCUTANEOUS
Status: DISCONTINUED | OUTPATIENT
Start: 2019-04-26 | End: 2019-04-27 | Stop reason: HOSPADM

## 2019-04-26 RX ORDER — LIDOCAINE HYDROCHLORIDE 10 MG/ML
1 INJECTION, SOLUTION EPIDURAL; INFILTRATION; INTRACAUDAL; PERINEURAL ONCE
Status: DISCONTINUED | OUTPATIENT
Start: 2019-04-26 | End: 2019-04-26 | Stop reason: HOSPADM

## 2019-04-26 RX ORDER — ACETAMINOPHEN 500 MG
1000 TABLET ORAL
Status: COMPLETED | OUTPATIENT
Start: 2019-04-26 | End: 2019-04-26

## 2019-04-26 RX ORDER — HYDROMORPHONE HYDROCHLORIDE 2 MG/ML
INJECTION, SOLUTION INTRAMUSCULAR; INTRAVENOUS; SUBCUTANEOUS
Status: COMPLETED
Start: 2019-04-26 | End: 2019-04-26

## 2019-04-26 RX ORDER — SODIUM CHLORIDE 9 MG/ML
INJECTION, SOLUTION INTRAVENOUS CONTINUOUS PRN
Status: DISCONTINUED | OUTPATIENT
Start: 2019-04-26 | End: 2019-04-26

## 2019-04-26 RX ORDER — ONDANSETRON 2 MG/ML
INJECTION INTRAMUSCULAR; INTRAVENOUS
Status: DISCONTINUED | OUTPATIENT
Start: 2019-04-26 | End: 2019-04-26

## 2019-04-26 RX ORDER — ROCURONIUM BROMIDE 10 MG/ML
INJECTION, SOLUTION INTRAVENOUS
Status: DISCONTINUED | OUTPATIENT
Start: 2019-04-26 | End: 2019-04-26

## 2019-04-26 RX ORDER — TRAMADOL HYDROCHLORIDE 50 MG/1
100 TABLET ORAL EVERY 6 HOURS
Status: DISCONTINUED | OUTPATIENT
Start: 2019-04-26 | End: 2019-04-27 | Stop reason: HOSPADM

## 2019-04-26 RX ORDER — PREGABALIN 75 MG/1
75 CAPSULE ORAL
Status: DISCONTINUED | OUTPATIENT
Start: 2019-04-26 | End: 2019-04-26

## 2019-04-26 RX ORDER — AMOXICILLIN 250 MG
2 CAPSULE ORAL NIGHTLY PRN
Status: DISCONTINUED | OUTPATIENT
Start: 2019-04-26 | End: 2019-04-27 | Stop reason: HOSPADM

## 2019-04-26 RX ORDER — ACETAMINOPHEN 325 MG/1
650 TABLET ORAL EVERY 6 HOURS
Status: DISCONTINUED | OUTPATIENT
Start: 2019-04-26 | End: 2019-04-27 | Stop reason: HOSPADM

## 2019-04-26 RX ORDER — MAG HYDROX/ALUMINUM HYD/SIMETH 200-200-20
30 SUSPENSION, ORAL (FINAL DOSE FORM) ORAL EVERY 4 HOURS PRN
Status: DISCONTINUED | OUTPATIENT
Start: 2019-04-26 | End: 2019-04-27 | Stop reason: HOSPADM

## 2019-04-26 RX ADMIN — TRAMADOL HYDROCHLORIDE 100 MG: 50 TABLET, FILM COATED ORAL at 05:04

## 2019-04-26 RX ADMIN — ROCURONIUM BROMIDE 5 MG: 10 INJECTION, SOLUTION INTRAVENOUS at 07:04

## 2019-04-26 RX ADMIN — CELECOXIB 200 MG: 100 CAPSULE ORAL at 06:04

## 2019-04-26 RX ADMIN — FENTANYL CITRATE 100 MCG: 50 INJECTION, SOLUTION INTRAMUSCULAR; INTRAVENOUS at 07:04

## 2019-04-26 RX ADMIN — PROPOFOL 50 MG: 10 INJECTION, EMULSION INTRAVENOUS at 07:04

## 2019-04-26 RX ADMIN — GENTAMICIN SULFATE 80 MG: 80 INJECTION, SOLUTION INTRAVENOUS at 07:04

## 2019-04-26 RX ADMIN — HYDROMORPHONE HYDROCHLORIDE 0.5 MG: 2 INJECTION INTRAMUSCULAR; INTRAVENOUS; SUBCUTANEOUS at 11:04

## 2019-04-26 RX ADMIN — ACETAMINOPHEN 650 MG: 325 TABLET ORAL at 05:04

## 2019-04-26 RX ADMIN — SODIUM CHLORIDE: 0.9 INJECTION, SOLUTION INTRAVENOUS at 07:04

## 2019-04-26 RX ADMIN — KETAMINE HYDROCHLORIDE 10 MG: 50 INJECTION, SOLUTION, CONCENTRATE INTRAMUSCULAR; INTRAVENOUS at 09:04

## 2019-04-26 RX ADMIN — ONDANSETRON 8 MG: 8 TABLET, ORALLY DISINTEGRATING ORAL at 07:04

## 2019-04-26 RX ADMIN — FENTANYL CITRATE 150 MCG: 50 INJECTION, SOLUTION INTRAMUSCULAR; INTRAVENOUS at 07:04

## 2019-04-26 RX ADMIN — LABETALOL HYDROCHLORIDE 5 MG: 5 INJECTION, SOLUTION INTRAVENOUS at 01:04

## 2019-04-26 RX ADMIN — ONDANSETRON 8 MG: 2 INJECTION, SOLUTION INTRAMUSCULAR; INTRAVENOUS at 10:04

## 2019-04-26 RX ADMIN — PROPOFOL 150 MG: 10 INJECTION, EMULSION INTRAVENOUS at 07:04

## 2019-04-26 RX ADMIN — POTASSIUM CHLORIDE, DEXTROSE MONOHYDRATE AND SODIUM CHLORIDE: 150; 5; 900 INJECTION, SOLUTION INTRAVENOUS at 04:04

## 2019-04-26 RX ADMIN — ACETAMINOPHEN 1000 MG: 500 TABLET ORAL at 06:04

## 2019-04-26 RX ADMIN — METHOCARBAMOL TABLETS 750 MG: 750 TABLET, COATED ORAL at 08:04

## 2019-04-26 RX ADMIN — HYDROMORPHONE HYDROCHLORIDE 0.5 MG: 2 INJECTION, SOLUTION INTRAMUSCULAR; INTRAVENOUS; SUBCUTANEOUS at 11:04

## 2019-04-26 RX ADMIN — PROPOFOL 100 MCG/KG/MIN: 10 INJECTION, EMULSION INTRAVENOUS at 07:04

## 2019-04-26 RX ADMIN — KETAMINE HYDROCHLORIDE 30 MG: 50 INJECTION, SOLUTION, CONCENTRATE INTRAMUSCULAR; INTRAVENOUS at 08:04

## 2019-04-26 RX ADMIN — ALBUMIN (HUMAN): 25 SOLUTION INTRAVENOUS at 09:04

## 2019-04-26 RX ADMIN — VANCOMYCIN HYDROCHLORIDE 1 G: 1 INJECTION, POWDER, LYOPHILIZED, FOR SOLUTION INTRAVENOUS at 07:04

## 2019-04-26 RX ADMIN — PROMETHAZINE HYDROCHLORIDE 6.25 MG: 25 INJECTION INTRAMUSCULAR; INTRAVENOUS at 11:04

## 2019-04-26 RX ADMIN — OXYCODONE HYDROCHLORIDE 10 MG: 5 TABLET ORAL at 03:04

## 2019-04-26 RX ADMIN — SODIUM CHLORIDE, SODIUM LACTATE, POTASSIUM CHLORIDE, AND CALCIUM CHLORIDE: .6; .31; .03; .02 INJECTION, SOLUTION INTRAVENOUS at 08:04

## 2019-04-26 RX ADMIN — REMIFENTANIL HYDROCHLORIDE 0.05 MCG/KG/MIN: 1 INJECTION, POWDER, LYOPHILIZED, FOR SOLUTION INTRAVENOUS at 07:04

## 2019-04-26 RX ADMIN — MUPIROCIN 1 G: 20 OINTMENT TOPICAL at 08:04

## 2019-04-26 RX ADMIN — SUCCINYLCHOLINE CHLORIDE 120 MG: 20 INJECTION, SOLUTION INTRAMUSCULAR; INTRAVENOUS at 07:04

## 2019-04-26 RX ADMIN — LIDOCAINE HYDROCHLORIDE 100 MG: 20 INJECTION, SOLUTION INTRAVENOUS at 07:04

## 2019-04-26 RX ADMIN — HYDROMORPHONE HYDROCHLORIDE 2 MG: 2 INJECTION, SOLUTION INTRAMUSCULAR; INTRAVENOUS; SUBCUTANEOUS at 08:04

## 2019-04-26 RX ADMIN — LABETALOL HYDROCHLORIDE 5 MG: 5 INJECTION, SOLUTION INTRAVENOUS at 05:04

## 2019-04-26 RX ADMIN — MIDAZOLAM 2 MG: 1 INJECTION INTRAMUSCULAR; INTRAVENOUS at 07:04

## 2019-04-26 RX ADMIN — METHOCARBAMOL TABLETS 750 MG: 750 TABLET, COATED ORAL at 03:04

## 2019-04-26 RX ADMIN — HEPARIN SODIUM 5000 UNITS: 5000 INJECTION, SOLUTION INTRAVENOUS; SUBCUTANEOUS at 08:04

## 2019-04-26 RX ADMIN — OXYCODONE HYDROCHLORIDE 10 MG: 10 TABLET, FILM COATED, EXTENDED RELEASE ORAL at 06:04

## 2019-04-26 RX ADMIN — CELECOXIB 200 MG: 100 CAPSULE ORAL at 08:04

## 2019-04-26 RX ADMIN — SODIUM CHLORIDE, SODIUM LACTATE, POTASSIUM CHLORIDE, AND CALCIUM CHLORIDE: .6; .31; .03; .02 INJECTION, SOLUTION INTRAVENOUS at 07:04

## 2019-04-26 NOTE — H&P
H&P NOTE     DATE OF SERVICE:  04/26/2019     ATTENDING PHYSICIAN:  Ayaan Bean MD     SUBJECTIVE:     INTERIM HISTORY:     This is a very pleasant 53 y.o. female, who is status post C4-6 ACDF in 2015 and then extension to C6/7 in 2016 presents in fu.  She has continued neck pain that radiates into bilateral shoulder blades and up into back of head.  Movement, bending forward and reading exacerbate the pain.  Nothing alleviates the pain aside from nusynta.  She recently was put on steroids for acute exacerbation of muscle spasms in her neck and states she has had some mild improvement in spasms.  She has bilateral UE parasthesias that have also improved with nusynta.  She denies hand weakness, dropping items, or gait instability.  At last clinic appt it was recommended to do PT which she states wasn't coordinated for her.  She occasionally does deep neck flexor exercises.        Neck Disability  Neck Disability-Pain Score: 9  Neck Disability-Pain Intensity: The pain is very severe at the moment  Neck Disability-Personal Care: It is painful to look after myself and I am slow and careful  Neck Disability-Lifting: I cannot lift or carry anything at all  Neck Disability-Reading: I can hardly read at all, because of severe pain in my neck  Neck Disability-Headaches: I have headaches almost all of the time  Neck Disability-Concentration: I have a great deal of difficulty in concentrating when I want to  Neck Disability-work: I cannot do any work at all  Neck Disability-Driving: I cant drive my car as long as I want because od moderate pain in my neck  Neck Disability-Sleeping: My sleep is completely disturbed (5-7 hours sleeplessness)  Neck Disability-Recreation: I cant do any recreation activities at all     PAST MEDICAL HISTORY:       Active Ambulatory Problems     Diagnosis Date Noted    HTN (hypertension) 08/13/2014    Renal insufficiency 08/13/2014    SOB (shortness of breath) 08/13/2014    Renal stones  2014    Spinal stenosis of cervical region 2015    Cervical disc disorder with radiculopathy of mid-cervical region 2016    Cervical disc disorder at C6-C7 level with radiculopathy 2016           Resolved Ambulatory Problems     Diagnosis Date Noted    No Resolved Ambulatory Problems           Past Medical History:   Diagnosis Date    Anxiety      Arthritis      Bronchitis      Depression      Fibromyalgia      Hypertension      Kidney stones      Migraine      Recurrent UTI      S/P Botox injection           PAST SURGICAL HISTORY:        Past Surgical History:   Procedure Laterality Date    CERVICAL FUSION         SECTION   , ,     CYSTOSCOPY W/ URETERAL STENT PLACEMENT Right       10 times    FUSION CERVICAL ANTERIOR C6-C7, DISCECTOMY C6-C7 N/A 2016     Performed by [facility]ana Dickinson [facility] at [facility] OR    FUSION CERVICAL ANTERIOR WITH NAVIGATION-(ACDF)- C4-5  C5-6 N/A 2015     Performed by [facility] at [facility] OR    HARVESTING-BONE GRAFT N/A 2015     Performed by [facility] at [facility] OR    HARVESTING-BONE GRAFT - iliac crest Right 2016     Performed by [facility]ana Dickinson MD at [facility] OR    LITHOTRIPSY Right 2012 to present     right side 9 times    NEPHRECTOMY Right 2014     Robotic assisted    pain pump   2007     removed     TONSILLECTOMY       URETER SURGERY Right 3/22/2013 & 2014     Robotic both surgeries         SOCIAL HISTORY:   Social History            Socioeconomic History    Marital status:        Spouse name: Not on file    Number of children: Not on file    Years of education: Not on file    Highest education level: Not on file   Social Needs    Financial resource strain: Not on file    Food insecurity - worry: Not on file    Food insecurity - inability: Not on file    Transportation needs - medical: Not on file    Transportation needs - non-medical: Not on file    Occupational History    Not on file   Tobacco Use    Smoking status: Never Smoker    Smokeless tobacco: Never Used   Substance and Sexual Activity    Alcohol use: No    Drug use: No    Sexual activity: Not on file   Other Topics Concern    Not on file   Social History Narrative    Not on file         FAMILY HISTORY:        Family History   Problem Relation Age of Onset    Hyperlipidemia Mother      Diabetes Father      Heart disease Father      Hypertension Father      Kidney disease Father      Hyperlipidemia Brother           CURRENTS MEDICATIONS:         Current Outpatient Medications on File Prior to Visit   Medication Sig Dispense Refill    alprazolam (XANAX) 1 MG tablet Take 1 mg by mouth 3 (three) times daily as needed for Anxiety.    2    baclofen (LIORESAL) 10 MG tablet Take 1 tablet (10 mg total) by mouth 3 (three) times daily. Take 1 tablet by mouth 3 times daily for 3 days. 9 tablet 0    butalbital-acetaminophen-caffeine -40 mg (FIORICET, ESGIC) -40 mg per tablet Take 1 tablet by mouth every 6 (six) hours as needed.        COCONUT OIL (COCONUT MISC) 1 tablet by Misc.(Non-Drug; Combo Route) route once daily.        cpm-phenyleph-acetaminophen 4- mg Tab Take 1 tablet by mouth 3 (three) times daily as needed.        cyclobenzaprine (FLEXERIL) 10 MG tablet Take 10 mg by mouth 2 (two) times daily.    1    dicyclomine (BENTYL) 10 MG capsule     4    diphenoxylate-atropine 2.5-0.025 mg (LOMOTIL) 2.5-0.025 mg per tablet     1    fluticasone (FLONASE) 50 mcg/actuation nasal spray     4    hydrocodone-acetaminophen 10-325mg (NORCO)  mg Tab Take 1 tablet by mouth every 6 (six) hours as needed.         HYDROCORT/MIN OIL/PETROLAT,WHT (HYDROCORTISONE-MIN OIL-WHT PET) 1 % Oint APPLY SMALL AMOUNT (1-2 GRAMS) TO AFFECTED AREA 2-4 TIMES DAILY AS DIRECTED FOR REDNESS RELIEF.   0    hydrOXYzine (ATARAX) 50 MG tablet Take 1 tablet (50 mg total) by mouth 3 (three) times  daily. Take 1 tablet by mouth 3 times daily for 3 days. 9 tablet 0    lactobacillus acidophilus (ACIDOPHILUS) Cap Take 1 capsule by mouth once daily.        lidocaine-prilocaine (EMLA) cream APPLY A SMALL AMOUNT (2-3GRAMS) TOPICALLY TO AFFECTED AREA  3 TIMES A DAY AS DIRECTED.   0    ondansetron (ZOFRAN-ODT) 8 MG TbDL DISSOLVE 1 TABLET BY MOUTH EVERY 12 HOURS AS NEEDED 20 tablet 1    potassium chloride SA (K-DUR,KLOR-CON) 20 MEQ tablet     4    promethazine (PHENERGAN) 25 MG tablet     1    ranitidine (ZANTAC) 150 MG capsule Take 1 capsule (150 mg total) by mouth 2 (two) times daily. 60 capsule 11    RELISTOR 150 mg Tab     4    rizatriptan (MAXALT) 10 MG tablet 1 tab PO PRN migraine. May repeat every 2 hours for max 3 tabs in 24 hours. Use no more than 10 days per month. 18 tablet 3    temazepam (RESTORIL) 22.5 MG capsule Take 22.5 mg by mouth nightly as needed for Insomnia.        tiZANidine (ZANAFLEX) 4 MG tablet TAKE 1 OR 1 & 1/2 TABLET(S) BY MOUTH AT NIGHT AS NEEDED FOR MUSCLE SPASM 45 tablet 3      No current facility-administered medications on file prior to visit.          ALLERGIES:        Review of patient's allergies indicates:   Allergen Reactions    Adhesive Hives and Rash    Antihistamines - alkylamine Rash       hives    Demerol [meperidine] Other (See Comments)       Weakness, lethargy    Levaquin [levofloxacin] Hives    Macrobid [nitrofurantoin monohyd/m-cryst] Hives    Macrodantin [nitrofurantoin macrocrystalline]      Morphine Itching       Pill form    Penicillins Rash    Sulfa (sulfonamide antibiotics) Rash         REVIEW OF SYSTEMS:  Review of Systems   Constitutional: Negative for diaphoresis, fever and weight loss.   Respiratory: Negative for shortness of breath.    Cardiovascular: Negative for chest pain.   Gastrointestinal: Negative for blood in stool.   Genitourinary: Negative for hematuria.   Endo/Heme/Allergies: Does not bruise/bleed easily.   All other systems  reviewed and are negative.           OBJECTIVE:     PHYSICAL EXAMINATION:   There were no vitals filed for this visit.     Physical Exam:  Vitals reviewed.     Constitutional: She appears well-developed and well-nourished.      Eyes: Pupils are equal, round, and reactive to light. Conjunctivae and EOM are normal.      Cardiovascular: Normal distal pulses and no edema.      Abdominal: Soft.      Skin: Skin displays no rash on trunk and no rash on extremities. Skin displays no lesions on trunk and no lesions on extremities.     Psych/Behavior: She is alert. She is oriented to person, place, and time. She has a normal mood and affect.     Musculoskeletal:        Neck: Range of motion is limited.     Neurological:        Coordination: She has a normal Romberg Test.        DTRs: Tricep reflexes are 2+ on the right side and 2+ on the left side. Bicep reflexes are 2+ on the right side and 2+ on the left side. Brachioradialis reflexes are 2+ on the right side and 2+ on the left side. Patellar reflexes are 2+ on the right side and 2+ on the left side. Achilles reflexes are 2+ on the right side and 2+ on the left side.         Back Exam      Tenderness   The patient is experiencing tenderness in the cervical.     Range of Motion   Extension: normal   Flexion: normal   Lateral bend right: normal   Lateral bend left: normal   Rotation right: normal   Rotation left: normal      Muscle Strength   Right Quadriceps:  5/5   Left Quadriceps:  5/5   Right Hamstrings:  5/5   Left Hamstrings:  5/5      Tests   Straight leg raise right: negative  Straight leg raise left: negative     Other   Toe walk: normal  Heel walk: normal                 SI joint:   Palpation at the right and left SI joints not painful  BINU test is negative bilaterally  Gaenslen test is negative bilaterally  Thigh thrust test is negative bilaterally     Neurologic Exam      Mental Status   Oriented to person, place, and time.   Speech: speech is normal   Level of  consciousness: alert     Cranial Nerves   Cranial nerves II through XII intact.      CN III, IV, VI   Pupils are equal, round, and reactive to light.  Extraocular motions are normal.      Motor Exam   Muscle bulk: normal  Overall muscle tone: normal     Strength   Right deltoid: 5/5  Left deltoid: 5/5  Right biceps: 5/5  Left biceps: 5/5  Right triceps: 5/5  Left triceps: 5/5  Right wrist flexion: 5/5  Left wrist flexion: 5/5  Right wrist extension: 5/5  Left wrist extension: 5/5  Right interossei: 5/5  Left interossei: 5/5  Right iliopsoas: 5/5  Left iliopsoas: 5/5  Right quadriceps: 5/5  Left quadriceps: 5/5  Right hamstrin/5  Left hamstrin/5  Right anterior tibial: 5/5  Left anterior tibial: 5/5  Right posterior tibial: 5/5  Left posterior tibial: 5/5  Right peroneal: 5/5  Left peroneal: 5/5  Right gastroc: 5/5  Left gastroc: 5/5     Sensory Exam   Light touch normal.   Pinprick normal.      Gait, Coordination, and Reflexes      Gait  Gait: normal     Coordination   Romberg: negative  Finger to nose coordination: normal  Tandem walking coordination: normal     Reflexes   Right brachioradialis: 2+  Left brachioradialis: 2+  Right biceps: 2+  Left biceps: 2+  Right triceps: 2+  Left triceps: 2+  Right patellar: 2+  Left patellar: 2+  Right achilles: 2+  Left achilles: 2+  Right plantar: normal  Left plantar: normal  Right Tyson: absent  Left Tyson: absent  Right ankle clonus: absent  Left ankle clonus: absent           DIAGNOSTIC DATA:  NM spect scan demonstrates focal uptake at C6/7  Ct scan cervical spine shows loosening of C7 screws  Cervical XR flexion-extension shows movement at C6-7     ASSESMENT:  This is a 53 y.o. female with s/p C4-6 ACDF in 2015 and extension of fusion to C6/7 in 2016 with continued neck pain.           Problem List Items Addressed This Visit      None               Visit Diagnoses      Pseudoarthrosis of cervical spine, subsequent encounter    -  Primary     S/P cervical  spinal fusion                    PLAN:  I explained the natural history of the disease and all treatment options. I recommended a C4 to T1 laminectomy and posterior instrumented fusion to treat the pseudoarthrosis and help with neck pain.      We have discussed the risks of surgery including bleeding, infection, failure of surgery, CSF leak, nerve root injury, spinal cord injury, weakness, paralysis, peripheral neuropathy, malplaced hardware, migration of hardware, non-union, need for reoperation. Patient understands the risks and would like to proceed with surgery.     Compound cream for myofascial pain     The patient has increase perioperative risks because of these comorbidities: essential hypertension.           Ayaan Bean MD  Cell:[phone]

## 2019-04-26 NOTE — PLAN OF CARE
VN note: VN cued into pt's room for introduction. VN informed patient and family that VN would be working closely along side bedside nurse, PCT, and the rest of care team and making rounds throughout the shift. They verbalized understanding. Allowed time for questions. VN will continue to be available to patient and intervene prn.

## 2019-04-26 NOTE — TRANSFER OF CARE
"Anesthesia Transfer of Care Note    Patient: Karla Bravo    Procedure(s) Performed: Procedure(s) (LRB):  LAMINECTOMY, SPINE, CERVICAL, WITH FUSION C4-T1 Laminectomy and Posterior Instrumented Fusion (N/A)    Patient location: PACU    Anesthesia Type: general    Transport from OR: Transported from OR on 6-10 L/min O2 by face mask with adequate spontaneous ventilation    Post pain: adequate analgesia    Post assessment: no apparent anesthetic complications    Post vital signs: stable    Level of consciousness: awake, alert and oriented    Nausea/Vomiting: no nausea/vomiting    Complications: none    Transfer of care protocol was followed      Last vitals:   Visit Vitals  BP (!) 166/94   Pulse 100   Temp 35.9 °C (96.6 °F) (Skin)   Resp 17   Ht 4' 8" (1.422 m)   Wt 67.1 kg (148 lb)   LMP  (LMP Unknown)   SpO2 100%   Breastfeeding? No   BMI 33.18 kg/m²     "

## 2019-04-26 NOTE — OP NOTE
DATE OF PROCEDURE: 04/26/2019   PREOPERATIVE DIAGNOSES:     1.Cervical spondylosis with myelopathy  2. S/p anterior cervical fusion with instrumentation from C4 to C7  3. C6-7 pseudoarthrosis  4. Chronic neck pain     POSTOPERATIVE DIAGNOSES:     1.Cervical spondylosis with myelopathy  2. S/p anterior cervical fusion with instrumentation from C4 to C7  3. C6-7 pseudoarthrosis  4. Chronic neck pain     PROCEDURES PERFORMED:     1. Open posterior access to the cervical and thoracic spine.  2. Laminectomy at C3-C4, C4-5, C5-6, C6-7  3. Arthrodesis at C4-C5, C5-6, C6-7 and C7-T1 with autograft bone harvested from the same incision and DBX  4. Posterior instrumentation with lateral mass screws at C4, C5, C6, C7 and pedicle screws at T1 using the invest[employer] Quartex posterior instrumentation system  5. Neurophysiologic monitoring and intraoperative nerve stimulation  6. Fluoroscopic localization.      PRIMARY SURGEON: Ayaan Bean M.D.      ASSISTANT SURGEONS: MATT     INDICATION: This is a very pleasant 53 y.o. female with C6-7 pseudoarthrosis and chronic neck pain. The risks included the nerve root injury, spinal cord injury that can cause paralysis, hardware failure and subsidence that could require reoperation as well as hardware and screw malpositioning and a vertebral artery injury that can cause a stroke. The   patient wanted to proceed and consent was obtained.     DESCRIPTION OF THE PROCEDURE: The patient was intubated under general   anesthesia and positioned prone on bolsters and all pressure points were carefully padded. We fixed the head with the 3 pins [place] headholder. Fluoroscopy was used to make sure    that the cervical column was well aligned and neutral. A midline incision was    then planned with fluoroscopic localization. The patient was prepped and draped  in a typical sterile fashion. An incision was made with a 10-blade. The    fascial layer was then dissected with the Bovie and orthostatic  retractors were    placed. The midline avascular plane was dissected to expose the spinous    processes. Subperiosteal dissection was carried out with the Bovie. Exposure was then completed in subperiosteal fashion from inferior C3 to T1. The lateral masses of the cervical vertebrae were exposed in the same fashion.       We then used the rongeur to partially resect the spinous process of C4 and T1, the bone was saved as autograft, and a high-speed bur drill was used to drill the superior lamina of C4 and T1 and we identify the yellow ligament. The ligament was then resected with Kerrisons. We then drilled    struts at the interface of the lateral mass and the lamina from C4 to C7   bilaterally. By drilling the struts, the yellow ligament was exposed and this    ligament was resected with Kerrison. We then carefully dissected the ligament    from the dura. We were able to remove the C4 to C7 laminae and the ligaments in one piece. Hemostasis was done with bipolar and Surgiflo. We then removed the remaining ligament between C3-4 and C7-T1. At the end, the cevical dural sac was fully decompressed. Hemostasis was done to control the epidural bleeding with Surgifoam, bipolar and irrigation with copious saline was carried out.      We then turned our attention to the instrumentation. We drilled our  holes in the lateral masses of C4, C5, C6, C7 bilaterally. The trajectory was verified with the ball feeler to make sure that there was not breach medially and laterally. Finally 3.5 x 14 mm screws were placed in the bilateral C3, C4, C5 and C7 and the left C6.    The bilateral T1 pedicle were cannulated and 4.5 x 30 mm screws were placed.      We then used contoured titanium rods and the rods were persuaded to the screw tulips. The rods were fixed to the screw tulips with screw caps. All the screw heads were torqued. The wound was irrigated with copious saline.   All the bone that was collected during the laminectomy  was then put in the bone mill and was mixed with 10 cc of DBX. We used this mixture and placed it laterally from C4 to T1 to do the arthrodesis. Arthrodesis was achieved at C4-5, C5-6, C6-7, C7-T1. We then placed 1 epidural drain and the distal end of the drains were tunneled under the skin. The wound was closed in 3 layers. The muscular layer was    closed with 0 Vicryl and 1 g of vancomycin powder was placed over the muscle layer.The fascia was closed with interrupted 0 Vicryl. The subcutaneous layer was closed with 0 Vicryls. The skin was closed with vertical mattress sutures using 3.0 Nylon . The drain was fixed to the skin with 2-0 nylon. We put Bacitracin and Island dressing to cover the incision. The blood loss was about 700 mL. The final count was completed and there was no complication. The patient was then transferred to the recovery room with the supervision of the anesthesiology team.

## 2019-04-26 NOTE — ANESTHESIA POSTPROCEDURE EVALUATION
Anesthesia Post Evaluation    Patient: Karla Bravo    Procedure(s) Performed: Procedure(s) (LRB):  LAMINECTOMY, SPINE, CERVICAL, WITH FUSION C4-T1 Laminectomy and Posterior Instrumented Fusion (N/A)    Final Anesthesia Type: general  Patient location during evaluation: PACU  Patient participation: Yes- Able to Participate  Level of consciousness: awake and alert  Post-procedure vital signs: reviewed and stable  Pain management: adequate  Airway patency: patent  PONV status at discharge: No PONV  Anesthetic complications: no      Cardiovascular status: blood pressure returned to baseline  Respiratory status: unassisted  Hydration status: euvolemic  Follow-up not needed.          Vitals Value Taken Time   /96 4/26/2019  1:40 PM   Temp 35.9 °C (96.6 °F) 4/26/2019 10:50 AM   Pulse 88 4/26/2019  1:42 PM   Resp 12 4/26/2019  1:42 PM   SpO2 100 % 4/26/2019  1:42 PM   Vitals shown include unvalidated device data.      No case tracking events are documented in the log.      Pain/Bruce Score: Pain Rating Prior to Med Admin: 10 (4/26/2019 11:30 AM)

## 2019-04-27 ENCOUNTER — NURSE TRIAGE (OUTPATIENT)
Dept: ADMINISTRATIVE | Facility: CLINIC | Age: 54
End: 2019-04-27

## 2019-04-27 VITALS
OXYGEN SATURATION: 96 % | DIASTOLIC BLOOD PRESSURE: 80 MMHG | RESPIRATION RATE: 16 BRPM | WEIGHT: 148.38 LBS | BODY MASS INDEX: 33.38 KG/M2 | HEIGHT: 56 IN | SYSTOLIC BLOOD PRESSURE: 138 MMHG | TEMPERATURE: 98 F | HEART RATE: 108 BPM

## 2019-04-27 PROCEDURE — 97165 OT EVAL LOW COMPLEX 30 MIN: CPT

## 2019-04-27 PROCEDURE — 63600175 PHARM REV CODE 636 W HCPCS: Performed by: NEUROLOGICAL SURGERY

## 2019-04-27 PROCEDURE — 94799 UNLISTED PULMONARY SVC/PX: CPT

## 2019-04-27 PROCEDURE — 97161 PT EVAL LOW COMPLEX 20 MIN: CPT

## 2019-04-27 PROCEDURE — 94761 N-INVAS EAR/PLS OXIMETRY MLT: CPT

## 2019-04-27 PROCEDURE — 99900035 HC TECH TIME PER 15 MIN (STAT)

## 2019-04-27 PROCEDURE — 25000003 PHARM REV CODE 250: Performed by: NEUROLOGICAL SURGERY

## 2019-04-27 PROCEDURE — 97110 THERAPEUTIC EXERCISES: CPT

## 2019-04-27 PROCEDURE — 97535 SELF CARE MNGMENT TRAINING: CPT

## 2019-04-27 RX ORDER — DOCUSATE SODIUM 100 MG/1
100 CAPSULE, LIQUID FILLED ORAL 2 TIMES DAILY PRN
Qty: 40 CAPSULE | COMMUNITY
Start: 2019-04-27

## 2019-04-27 RX ORDER — HYDROMORPHONE HYDROCHLORIDE 2 MG/1
2-4 TABLET ORAL EVERY 4 HOURS PRN
Qty: 60 TABLET | Refills: 0 | Status: SHIPPED | OUTPATIENT
Start: 2019-04-27 | End: 2019-07-04

## 2019-04-27 RX ORDER — METHOCARBAMOL 750 MG/1
750 TABLET, FILM COATED ORAL 3 TIMES DAILY PRN
Qty: 60 TABLET | Refills: 2 | Status: SHIPPED | OUTPATIENT
Start: 2019-04-27 | End: 2019-05-07

## 2019-04-27 RX ADMIN — POTASSIUM CHLORIDE, DEXTROSE MONOHYDRATE AND SODIUM CHLORIDE: 150; 5; 900 INJECTION, SOLUTION INTRAVENOUS at 10:04

## 2019-04-27 RX ADMIN — POTASSIUM CHLORIDE, DEXTROSE MONOHYDRATE AND SODIUM CHLORIDE: 150; 5; 900 INJECTION, SOLUTION INTRAVENOUS at 12:04

## 2019-04-27 RX ADMIN — METHOCARBAMOL TABLETS 750 MG: 750 TABLET, COATED ORAL at 03:04

## 2019-04-27 RX ADMIN — METHOCARBAMOL TABLETS 750 MG: 750 TABLET, COATED ORAL at 08:04

## 2019-04-27 RX ADMIN — TRAMADOL HYDROCHLORIDE 100 MG: 50 TABLET, FILM COATED ORAL at 12:04

## 2019-04-27 RX ADMIN — OXYCODONE HYDROCHLORIDE 10 MG: 5 TABLET ORAL at 10:04

## 2019-04-27 RX ADMIN — HEPARIN SODIUM 5000 UNITS: 5000 INJECTION, SOLUTION INTRAVENOUS; SUBCUTANEOUS at 08:04

## 2019-04-27 RX ADMIN — TRAMADOL HYDROCHLORIDE 100 MG: 50 TABLET, FILM COATED ORAL at 05:04

## 2019-04-27 RX ADMIN — OXYCODONE HYDROCHLORIDE 10 MG: 5 TABLET ORAL at 03:04

## 2019-04-27 RX ADMIN — ACETAMINOPHEN 650 MG: 325 TABLET ORAL at 05:04

## 2019-04-27 RX ADMIN — ONDANSETRON 8 MG: 8 TABLET, ORALLY DISINTEGRATING ORAL at 03:04

## 2019-04-27 RX ADMIN — ACETAMINOPHEN 650 MG: 325 TABLET ORAL at 12:04

## 2019-04-27 RX ADMIN — MUPIROCIN 1 G: 20 OINTMENT TOPICAL at 08:04

## 2019-04-27 RX ADMIN — HYDROMORPHONE HYDROCHLORIDE 2 MG: 2 INJECTION, SOLUTION INTRAMUSCULAR; INTRAVENOUS; SUBCUTANEOUS at 05:04

## 2019-04-27 RX ADMIN — CELECOXIB 200 MG: 100 CAPSULE ORAL at 08:04

## 2019-04-27 NOTE — PROGRESS NOTES
NEUROSURGICAL POST-OPERATIVE PROGRESS NOTE    DATE OF SERVICE:  04/27/2019      ATTENDING PHYSICIAN:  Ayaan Bean MD    SUBJECTIVE:    INTERIM HISTORY:    This is a very pleasant 53 y.o. y.o. female, who is status day one C4 to T1 posterior fusion for pseudoarthosis. Doing well. Pain controlled by medication.               OBJECTIVE:  Vitals:    04/27/19 1138   BP: 138/80   Pulse: 108   Resp: 16   Temp: 97.8 °F (36.6 °C)       PHYSICAL EXAMINATION:     Neurosurgery Physical Exam    Ortho Exam    Neurologic Exam     Motor Exam     Strength   Strength 5/5 throughout.       Wound CDI    DIAGNOSTIC DATA:    X-ray of the cervical spine, AP and lateral views reveals the fusion hardware is intact. No loosening of screws or migration of hardware.    Drain: minimal draining.       ASSESMENT:    This is a 53 y.o. female who is s/p day one C4-T1 posterior fusion. No event overnight. Pain well controlled.     Problem List Items Addressed This Visit        Neuro    Pseudoarthrosis of cervical spine    Relevant Orders    Vital signs    Neurovascular checks    Intake and output    Chlorohexidine Gluconate Bath    PT evaluate and treat    OT evaluate and treat    Pulse Oximetry Q4H    Incentive spirometry    Hemovac drain    Document drain output    Keep Aspen brace on at all times    Advance diet as tolerated to Regular diet    Place WILLIAM hose    Place sequential compression device    X-Ray Cervical Spine AP And Lateral          PLAN:    Discharge home with dilaudid 2 mg PO 1-2 pills q4h prn  Robaxin 750 mg TID PRN  FU in 2 weeks for wound checks        Ayaan Bean MD  Pager: [phone]

## 2019-04-27 NOTE — PLAN OF CARE
Cued into patient's room.  Permission received per patient to turn camera to view patient.  Introduced as VN for night shift that will be working with floor nurse and nursing assistant.  Celestina RN at bedside.  Educated patient on VN's role in patient care. Plan of care reviewed with patient. Education per flowsheet.  Opportunity given for questions and questions answered.  Instructed to call for assistance.  Will cont to monitor.

## 2019-04-27 NOTE — PLAN OF CARE
Problem: Occupational Therapy Goal  Goal: Occupational Therapy Goal  Outcome: Outcome(s) achieved Date Met: 04/27/19  OT initial eval completed and treatment completed.  Pt and mother instructed in shower chair recommendation for home use and both assured OT that pt  will purchase from store. Role of OT and POC explained,cervical precautions review: no bendfing, no lifting and HEP for BUE  AROM ex  For: shld to 90 flex and abd and shld ext for warm up ex at home before OOB in the morning for stiffness. Discharge OT 2/2 pt discharge to laternate level of care .

## 2019-04-27 NOTE — PLAN OF CARE
Problem: Adult Inpatient Plan of Care  Goal: Plan of Care Review  Outcome: Ongoing (interventions implemented as appropriate)  Pt aaox4. Patient safety maintained. Pain controlled with morphine sub, acetaminophen and oxycodone as ordered. Pt family at bedside overnight. surgical dressing monitored. N&V controled with Zofran. No diarrhea. No distress noted. Will continue to monitor.

## 2019-04-27 NOTE — PLAN OF CARE
Problem: Physical Therapy Goal  Goal: Physical Therapy Goal  Goals to be met by: 19     Patient will increase functional independence with mobility by performin. Supine <> sit with Modified Ransom  2. Sit to stand transfer with Modified Ransom  3. Bed to chair transfer with Modified Ransom   4. Gait  x >200 feet with Modified Ransom without AD  5. Ascend/descend 5 stair with one Handrail Supervision .     Outcome: Ongoing (interventions implemented as appropriate)  PT evaluation was completed. Pt performed supine<>sit with SBA, sit<>stand with SBA and gait  without AD up to 100 ft  requiring CGAx  1 exhibiting slow gait and short steps. Pt needed cues for increasing B step length to improve reciprocal gait pattern.

## 2019-04-27 NOTE — DISCHARGE INSTRUCTIONS
Ok to shower on tomorrow, but do not immerse wound in water.  Remove dressing tomorrow.  Keep cervical collar on at all times except when showering.  Use ice pack 10 minutes q hour as needed around incision, upper back.

## 2019-04-27 NOTE — PT/OT/SLP EVAL
Physical Therapy Evaluation and Treatment    Patient Name:  Karla Bravo   MRN:  [id number]    Recommendations:     Discharge Recommendations:  home   Discharge Equipment Recommendations: shower chair   Barriers to discharge: None    Assessment:     Karla Bravo is a 53 y.o. female admitted with a medical diagnosis of Pseudoarthrosis of cervical spine.  She presents with the following impairments/functional limitations:  gait instability, impaired self care skills, impaired functional mobilty, impaired balance, pain, decreased upper extremity function, decreased ROM, orthopedic precautions. Pt performed supine<>sit with SBA, sit<>stand with SBA and gait  without AD up to 100 ft  requiring CGAx  1 exhibiting slow gait and short steps. Pt needed cues for increasing B step length to improve reciprocal gait pattern.      Rehab Prognosis: Good; patient would benefit from acute skilled PT services to address these deficits and reach maximum level of function.    Recent Surgery: Procedure(s) (LRB):  LAMINECTOMY, SPINE, CERVICAL, WITH FUSION C4-T1 Laminectomy and Posterior Instrumented Fusion (N/A) 1 Day Post-Op    Plan:     During this hospitalization, patient to be seen daily to address the identified rehab impairments via gait training, therapeutic activities, therapeutic exercises and progress toward the following goals:    · Plan of Care Expires:  05/27/19    Subjective     Chief Complaint:  Pain at surgical site.  Patient/Family Comments/goals: to return home and have no cervical pain  Pain/Comfort:  · Pain Rating 1: 6/10  · Location - Side 1: Bilateral  · Location - Orientation 1: posterior  · Location 1: cervical spine(surgical incision site)  · Pain Addressed 1: Pre-medicate for activity, Reposition, Cessation of Activity, Nurse notified  · Pain Rating Post-Intervention 1: 6/10    Patients cultural, spiritual, Church conflicts given the current situation: no    Living Environment:  Pt lives with   and adult  children in SS house, 5 steps with one rail to enter and WIS. Pt's mother with stay with her upon d/c to assist pt as needed.  Prior to admission, patients level of function was independent with ADLS and mobility without using any DME.  Equipment used at home: none(has a RW but doesn't use it.).  DME owned (not currently used): rolling walker.  Upon discharge, patient will have assistance from mother and  .    Objective:     Communicated with RN prior to session.  Patient found HOB elevated with peripheral IV, hemovac  upon PT entry to room.    General Precautions: Standard, fall   Orthopedic Precautions:spinal precautions   Braces: Aspen collar     Exams:  · RLE ROM: WFL  · RLE Strength: 4+/5  · LLE ROM: WFL  · LLE Strength: 4/5   · Sensation: intact in B LE ; decreased in B UE    Functional Mobility:  · Bed Mobility:     · Supine to Sit: stand by assistance  · Sit to Supine: stand by assistance  · Transfers:     · Sit to Stand:  stand by assistance with no AD  · Gait: ambulated 100 ft using a rw with aspen collar in place requiring CGA x1.   · Balance: Static stand: F+;    Dynamic stand: F      Therapeutic Activities and Exercises:   Performed seated B LE exs x 10 reps: AP, hip flex/ext and LAQ with instruction to perform  1-2 x day  Pt received vc for increasing B step length and neymar during gait training to improve quality  Pt  was educated in POC and role of PT      AM-PAC 6 CLICK MOBILITY  Total Score:18     Patient left HOB elevated with all lines intact, call button in reach, bed alarm on, RN notified and mother present.    GOALS:   Multidisciplinary Problems     Physical Therapy Goals        Problem: Physical Therapy Goal    Goal Priority Disciplines Outcome Goal Variances Interventions   Physical Therapy Goal     PT, PT/OT Ongoing (interventions implemented as appropriate)     Description:  Goals to be met by: 5/4/19     Patient will increase functional independence with  mobility by performin. Supine <> sit with Modified [place]  2. Sit to stand transfer with Modified [place]  3. Bed to chair transfer with Modified [place]   4. Gait  x >200 feet with Modified [place] without AD  5. Ascend/descend 5 stair with one Handrail Supervision .                       History:     Past Medical History:   Diagnosis Date    Anxiety     Arthritis     Bronchitis     related to sinuses    Depression     Fibromyalgia     Hypertension     said takes BP med PRN    Kidney stones     right side    Migraine     Recurrent UTI     S/P Botox injection     16 for Migraines       Past Surgical History:   Procedure Laterality Date    CERVICAL FUSION       SECTION  , ,     CYSTOSCOPY W/ URETERAL STENT PLACEMENT Right     10 times    FUSION CERVICAL ANTERIOR C6-C7, DISCECTOMY C6-C7 N/A 2016    [facility]    FUSION CERVICAL ANTERIOR WITH NAVIGATION-(ACDF)- C4-5  C5-6 N/A 2015    Performed by [facility] at [facility] OR    HARVESTING-BONE GRAFT N/A 2015    [facility] at [facility] OR    HARVESTING-BONE GRAFT - iliac crest Right 2016    [facility] by [facility] at [facility] OR    LITHOTRIPSY Right 2012 to present    right side 9 times    NEPHRECTOMY Right 2014    Robotic assisted    pain pump  2007    removed     TONSILLECTOMY      URETER SURGERY Right 3/22/2013 &     Robotic both surgeries    WRIST FRACTURE SURGERY         Time Tracking:     PT Received On: 19  PT Start Time: 1030     PT Stop Time: 1100  PT Total Time (min): 30 min     Billable Minutes: Evaluation 15 and Therapeutic Exercise 15      Guera Glover, PT  2019

## 2019-04-27 NOTE — PT/OT/SLP EVAL
Occupational Therapy   Evaluation/Treatment/Discharge Summary    Name: Karla Bravo  MRN: [id number]  Admitting Diagnosis:  Pseudoarthrosis of cervical spine 1 Day Post-Op    s/p Procedure(s):  LAMINECTOMY, SPINE, CERVICAL, WITH FUSION C4-T1 Laminectomy and Posterior Instrumented Fusion     Recommendations:     Discharge Recommendations: home with home health  Discharge Equipment Recommendations:  shower chair  Barriers to discharge:       Assessment:     Karla Bravo is a 53 y.o. female with a medical diagnosis of Pseudoarthrosis of cervical spine; s/p Procedure(s):  LAMINECTOMY, SPINE, CERVICAL, WITH FUSION C4-T1 Laminectomy and Posterior Instrumented Fusion .  She presents with neck and headache pain and decline in ADLs and fx mobility.  She would benefit from HH post acute stay. Shower chair recommended for home use.  DC OT 2/2 pt. Discharge to [facility] level fo acre HH. Performance deficits affecting function: pain, orthopedic precautions, impaired self care skills, impaired functional mobilty.      Rehab Prognosis: Good; patient would benefit from acute skilled OT services to address these deficits and reach maximum level of function.       Plan:     Patient to be seen   to address the above listed problems via    · Plan of Care Expires:    · Plan of Care Reviewed with: patient, mother    Subjective     Chief Complaint: headache  Patient/Family Comments/goals: none    Occupational Profile:  Living Environment: lives with spouse in [facility] with 5 steps with 1 HR; has walk n shower   Previous level of function: indep ADLs; ambulates indep without a device; drives and does IADLS.   Roles and Routines: Active  Equipment Used at Home:  (has RW but does not use it)  Assistance upon Discharge: mother and spouse    Pain/Comfort:  · Pain Rating 1: 6/10  · Location - Side 1: Bilateral  · Location - Orientation 1: posterior  · Location 1: neck  · Pain Addressed 1: Pre-medicate for activity, Reposition,  Distraction  · Pain Rating Post-Intervention 1: 6/10  · Pain Rating 2: 9/10  · Location - Side 2: Bilateral  · Location - Orientation 2: generalized  · Location 2: head(suffers with migraines)  · Pain Addressed 2: Pre-medicate for activity, Reposition, Distraction, Cessation of Activity  · Pain Rating Post-Intervention 2: 9/10    Patients cultural, spiritual, Mormonism conflicts given the current situation: no    Objective:     Communicated with: nurse prior to session.  Patient found HOB elevated with peripheral IV upon OT entry to room.    General Precautions: Standard, fall   Orthopedic Precautions:spinal precautions   Braces: Aspen collar     Occupational Performance:    Bed Mobility:    · Patient completed Scooting/Bridging with modified independence and HOB elevated  · Patient completed Supine to Sit with modified independence and HOB elevated  · Patient completed Sit to Supine with modified independence and HOB elevated    Functional Mobility/Transfers:  · Patient completed Sit <> Stand Transfer with modified independence  with  no assistive device   · Patient completed Toilet Transfer Step Transfer technique with modified independence with  no AD  · Functional Mobility: ambulated supervision with IV pole in tow to bathroom, sink and back to bed.    Activities of Daily Living:  · Feeding:  supervision 2/2 aspen brace limiting vision  · Grooming: supervision standing at sink   · Lower Body Dressing: modified independence socks seated EOB lifted legs to bed   · Toileting: modified independence from toilet uinated    Cognitive/Visual Perceptual:  Cognitive/Psychosocial Skills:     -       Oriented to: Person, Place, Time and Situation   -       Follows Commands/attention:Follows two-step commands  -       Communication: clear/fluent  -       Memory: No Deficits noted  -       Safety awareness/insight to disability: intact   -       Mood/Affect/Coping skills/emotional control: Appropriate to  situation  Visual/Perceptual:      -Intact .    Physical Exam:  Balance:    -       static:  good  dynamic:  good-   standing;  good  dynamic:  fair plus  Postural examination/scapula alignment:    -       No postural abnormalities identified  Sensation:    -       Intact  Motor Planning:    -       intact  Dominant hand:    -       right  Upper Extremity Range of Motion:     -       Right Upper Extremity: WFL  -       Left Upper Extremity: WFL  Upper Extremity Strength:    -       Right Upper Extremity: WFL  -       Left Upper Extremity: WFL   Strength:    -       Right Upper Extremity: WFL  -       Left Upper Extremity: WFL  Fine Motor Coordination:    -       Intact  Gross motor coordination:   WFL  Neurological:    -       normal tone    AMPAC 6 Click ADL:  AMPAC Total Score: 18    Treatment & Education:  Pt and mother instructed in shower chair recommendation for home use and both assured OT that pt  will purchase from store. Role of OT and POC explained,cervical precautions review: no bendfing, no lifting and HEP for BUE  AROM ex  For: shld to 90 flex and abd and shld ext for warm up ex at home before OOB in the morning for stiffness. Discharge OT 2/2 pt discharge to alternate level of care .      Education:    Patient left HOB elevated with all lines intact, call button in reach and motehr present    GOALS:   Multidisciplinary Problems     Occupational Therapy Goals     Not on file          Multidisciplinary Problems (Resolved)        Problem: Occupational Therapy Goal    Goal Priority Disciplines Outcome Interventions   Occupational Therapy Goal   (Resolved)     OT, PT/OT Outcome(s) achieved                    History:     Past Medical History:   Diagnosis Date    Anxiety     Arthritis     Bronchitis     related to sinuses    Depression     Fibromyalgia     Hypertension     said takes BP med PRN    Kidney stones     right side    Migraine     Recurrent UTI     S/P Botox injection      12-8-16 for Migraines       Past Surgical History:   Procedure Laterality Date    CERVICAL FUSION       SECTION  , ,     CYSTOSCOPY W/ URETERAL STENT PLACEMENT Right     10 times    FUSION CERVICAL ANTERIOR C6-C7, DISCECTOMY C6-C7 N/A 2016    [facility]    FUSION CERVICAL ANTERIOR WITH NAVIGATION-(ACDF)- C4-5  C5-6 N/A 2015    [facility] at [facility] OR    HARVESTING-BONE GRAFT N/A 2015    [facility] at [facility] OR    HARVESTING-BONE GRAFT - iliac crest Right 2016    [facility] at [facility] OR    LITHOTRIPSY Right 2012 to present    right side 9 times    NEPHRECTOMY Right 2014    Robotic assisted    pain pump  2007    removed     TONSILLECTOMY      URETER SURGERY Right 3/22/2013 &     Robotic both surgeries    WRIST FRACTURE SURGERY         Time Tracking:     OT Date of Treatment: 19  OT Start Time: 1515  OT Stop Time: 1533  OT Total Time (min): 18 min    Billable Minutes:Evaluation 10  Self Care/Home Management 8  Total Time 18    Kamla Walker OT  2019

## 2019-04-27 NOTE — PLAN OF CARE
orders sent and accepted through [facility] . [place] contacted [facility], Mr Lilly, [phone] regarding BSC. Mr Lilly gave permission for [place] to pull BSC from DME closet. [place] delivered bedside to pt prior to discharge. Shower Chair also ordered, insurance will not cover this item,     Future Appointments   Date Time Provider Department Center   5/10/2019  9:15 AM [facility] ORTHO CLINIC LIMIT 350LBS [facility]   5/10/2019  9:40 AM Robert Ruano PA-C [facility] NEUROSU [facility]   6/10/2019 11:15 AM [facility] ORTHO CLINIC LIMIT 350LBS [facility]   6/10/2019 11:30 AM [facility]yaan Bean MD [facility]       Pt's nurse will go over medications/signs and symptoms prior to discharge       04/27/19 1439   Final Note   Assessment Type Final Discharge Note   Anticipated Discharge Disposition Home-Health  ([facility])   What phone number can be called within the next 1-3 days to see how you are doing after discharge? [phone]   Hospital Follow Up  Appt(s) scheduled? Yes   Right Care Referral Info   Post Acute Recommendation Home-care     Karla Cruz, RN Transitional Navigator  [phone]

## 2019-04-28 NOTE — TELEPHONE ENCOUNTER
Reason for Disposition   [1] SEVERE pain AND [2] not improved 2 hours after pain medicine    Protocols used: ARM PAIN-A-AH    Patient states she has severe pain in both triceps and she is wearing her neck brace appropriately. The pain medications are not working. She was told to go to the ED for evaluation and she did not want to go. Again, I stressed to her that the ED needs to evaluated this severe pain because this may be a complication from her surgery. She verbalized understanding.

## 2019-04-29 NOTE — DISCHARGE SUMMARY
[facility]  Neurosurgery  Discharge Summary      Patient Name: Karla Bravo  MRN: [id number]  Admission Date: 4/26/2019  Hospital Length of Stay: 1 days  Discharge Date and Time: 4/27/2019  4:01 PM  Attending Physician: No att. providers found   Discharging Provider: Ayaan Bean MD  Primary Care Provider: Phill Miller MD     HPI:  Patient with prior anterior cervical fusion and pseudoarthrosis at chronic neck pain    Procedure(s) (LRB):  LAMINECTOMY, SPINE, CERVICAL, WITH FUSION C4-T1 Laminectomy and Posterior Instrumented Fusion (N/A)     Hospital Course:  Patient underwent a posterior C4-T1 laminectomy instrumented fusion.  Epidural drain was removed on postop day 1.  Patient was discharged on postop day 1 home.  Patient incisional pain was well controlled with medication.    Consults: PT-OT    Significant Diagnostic Studies:  Cervical x-rays shows good positioning of instrumentation    Pending Diagnostic Studies:     None        Final Active Diagnoses:    Diagnosis Date Noted POA    PRINCIPAL PROBLEM:  Pseudoarthrosis of cervical spine [S12.9XXA] 04/26/2019 Yes      Problems Resolved During this Admission:      Discharged Condition: good    [place] [facility]    Follow Up:  Follow-up Information     Robert Ruano PA-C. Go on 5/10/2019.    Specialty:  Neurosurgery  Why:   Follow-Up / @ 9:40 am  Contact information:  [address]  [address]  [place] [address]  [phone]             Ayaan Bean MD. Go on 6/10/2019.    Specialty:  Neurosurgery  Why:  Follow-Up / @ 11:30 am  Contact information:  [address]  [place] [address]  [phone]             [facility].    Specialty:  Home Health Services  Why:  Home Health nurse will contact patient to arrange a visit or Sunday 4/28/19  Contact information:  [address], [address]  [place] [address]  [phone]             [facility]   "  Specialty:  DME Provider  Why:  DME provider. A bath/shower chair was also ordered. Insurance will not cover this item. One can be purchased out of pocket at [employer] or [employer] for around $45-$65.  Contact information:  [address]  [facility] [phone]                 Patient Instructions:      COMMODE FOR HOME USE     Order Specific Question Answer Comments   Type: Standard    Height: 4' 8" (1.422 m)    Weight: 67.3 kg (148 lb 5.9 oz)    Does patient have medical equipment at home? none Has a walker at home   Length of need (1-99 months): 1      BATH/SHOWER CHAIR FOR HOME USE     Order Specific Question Answer Comments   Height: 4' 8" (1.422 m)    Weight: 67.3 kg (148 lb 5.9 oz)    Does patient have medical equipment at home? none Has a walker at home   Length of need (1-99 months): 1    Type: With back      Medications:  Reconciled Home Medications:      Medication List      START taking these medications    docusate sodium 100 MG capsule  Commonly known as:  COLACE  Take 1 capsule (100 mg total) by mouth 2 (two) times daily as needed.     HYDROmorphone 2 MG tablet  Commonly known as:  DILAUDID  Take 1-2 tablets (2-4 mg total) by mouth every 4 (four) hours as needed for Pain.     methocarbamol 750 MG Tab  Commonly known as:  ROBAXIN  Take 1 tablet (750 mg total) by mouth 3 (three) times daily as needed.        CONTINUE taking these medications    butalbital-acetaminophen-caffeine -40 mg -40 mg per tablet  Commonly known as:  FIORICET, ESGIC  Take 1 tablet by mouth every 6 (six) hours as needed.     clonazePAM 1 MG tablet  Commonly known as:  KLONOPIN  Take 1 mg by mouth 2 (two) times daily as needed for Anxiety.     cpm-phenyleph-acetaminophen 4- mg Tab  Take 1 tablet by mouth 3 (three) times daily as needed.     fluticasone 50 mcg/actuation nasal spray  Commonly known as:  FLONASE     hydrocortisone-min oil-wht pet 1 % Oint  APPLY SMALL AMOUNT " (1-2 GRAMS) TO AFFECTED AREA 2-4 TIMES DAILY AS DIRECTED FOR REDNESS RELIEF.     lidocaine-prilocaine cream  Commonly known as:  EMLA  APPLY A SMALL AMOUNT (2-3GRAMS) TOPICALLY TO AFFECTED AREA  3 TIMES A DAY AS DIRECTED.     ondansetron 8 MG Tbdl  Commonly known as:  ZOFRAN-ODT  DISSOLVE 1 TABLET BY MOUTH EVERY 12 HOURS AS NEEDED     potassium chloride SA 20 MEQ tablet  Commonly known as:  K-DUR,KLOR-CON     promethazine 25 MG tablet  Commonly known as:  PHENERGAN     RELISTOR 150 mg Tab  Generic drug:  methylnaltrexone        STOP taking these medications    tiZANidine 4 MG tablet  Commonly known as:  ZANAFLEX            Ayaan Bean MD  Neurosurgery  [facility]

## 2019-05-02 ENCOUNTER — TELEPHONE (OUTPATIENT)
Dept: NEUROLOGY | Facility: HOSPITAL | Age: 54
End: 2019-05-02

## 2019-05-02 NOTE — TELEPHONE ENCOUNTER
----- Message from Ayaan Bean MD sent at 5/2/2019 11:58 AM CDT -----  Contact: [phone]/self  Patches are not covered. She can buy them over the counter.   ----- Message -----  From: Emily Vasquez MA  Sent: 5/1/2019   1:36 PM  To: Ayaan Bean MD        ----- Message -----  From: Torrie Redmond  Sent: 5/1/2019   1:21 PM  To: Vikas Kuo Staff    Patient stopped taking the methocarbamol (ROBAXIN) 750 MG Tab and started the ZANAFLEX.      She would like to get the patches instead of the  lidocaine-prilocaine (EMLA) cream.   The patches work better.   Send to [facility] - [facility] [address] [address]

## 2019-05-02 NOTE — TELEPHONE ENCOUNTER
I called the patient to let her know the cream was not covered and she can get it from over the counter. I had to leave a message for a returned call from the patient.

## 2019-05-04 ENCOUNTER — TELEPHONE (OUTPATIENT)
Dept: ADMINISTRATIVE | Facility: CLINIC | Age: 54
End: 2019-05-04

## 2019-05-04 NOTE — TELEPHONE ENCOUNTER
HH SOC with [facility].  Dr. Ayaan Bean.  SN, PT, OT services.

## 2019-05-10 ENCOUNTER — OFFICE VISIT (OUTPATIENT)
Dept: NEUROSURGERY | Facility: CLINIC | Age: 54
End: 2019-05-10
Payer: COMMERCIAL

## 2019-05-10 ENCOUNTER — HOSPITAL ENCOUNTER (OUTPATIENT)
Dept: RADIOLOGY | Facility: HOSPITAL | Age: 54
Discharge: HOME | End: 2019-05-10
Attending: NEUROLOGICAL SURGERY
Payer: COMMERCIAL

## 2019-05-10 VITALS
HEART RATE: 124 BPM | HEIGHT: 56 IN | TEMPERATURE: 98 F | BODY MASS INDEX: 33.52 KG/M2 | SYSTOLIC BLOOD PRESSURE: 129 MMHG | DIASTOLIC BLOOD PRESSURE: 84 MMHG | WEIGHT: 149 LBS

## 2019-05-10 DIAGNOSIS — Z98.1 S/P CERVICAL SPINAL FUSION: ICD-10-CM

## 2019-05-10 DIAGNOSIS — Z51.89 VISIT FOR WOUND CHECK: Primary | ICD-10-CM

## 2019-05-10 PROCEDURE — 99999 PR PBB SHADOW E&M-EST. PATIENT-LVL IV: ICD-10-PCS | Mod: PBBFAC,,, | Performed by: PHYSICIAN ASSISTANT

## 2019-05-10 PROCEDURE — 99024 PR POST-OP FOLLOW-UP VISIT: ICD-10-PCS | Mod: S$GLB,,, | Performed by: PHYSICIAN ASSISTANT

## 2019-05-10 PROCEDURE — 99024 POSTOP FOLLOW-UP VISIT: CPT | Mod: S$GLB,,, | Performed by: PHYSICIAN ASSISTANT

## 2019-05-10 PROCEDURE — 72040 XR CERVICAL SPINE AP LATERAL: ICD-10-PCS | Mod: 26,,, | Performed by: RADIOLOGY

## 2019-05-10 PROCEDURE — 99999 PR PBB SHADOW E&M-EST. PATIENT-LVL IV: CPT | Mod: PBBFAC,,, | Performed by: PHYSICIAN ASSISTANT

## 2019-05-10 PROCEDURE — 72040 X-RAY EXAM NECK SPINE 2-3 VW: CPT | Mod: 26,,, | Performed by: RADIOLOGY

## 2019-05-10 PROCEDURE — 72040 X-RAY EXAM NECK SPINE 2-3 VW: CPT | Mod: TC,PN

## 2019-05-10 RX ORDER — TIZANIDINE 4 MG/1
4 TABLET ORAL EVERY 6 HOURS PRN
Qty: 60 TABLET | Refills: 0 | Status: SHIPPED | OUTPATIENT
Start: 2019-05-10 | End: 2019-07-19 | Stop reason: SDUPTHER

## 2019-05-10 NOTE — PROGRESS NOTES
Neurosurgery Wound Check Progress Note    HPI  Karla Bravo is 53 y.o. female with a history of hypertension, migraine, fibromyalgia, depression, anxiety, previous C4-C6 ACDF by outside surgeon on 11/17/2015 with subsequent revision C6-C7 ACDF on 12/9/2016 who ultimately underwent subsequent posterior C4 to T1 posterior cervical surgery with Dr. Bean 04/26/2019 for chronic pain and C6-7 pseudoarthrosis.  She presents for 2 weeks wound check.  She reports that she is doing a lot better since surgery.  Preoperative neck pain that radiates to mid thoracic have improved significantly.  Now she has some bilateral anterior neck muscle spasms (right greater than left).  She can stand for too long since it worsens her neck pain.  She is wearing her bone stimulator for 4 hr a day and is compliant with her cervical collar.  She continues to do home health therapy at home.  She has been taking her pain meds and mostly takes muscle relaxers when her pain is severe.  She continues to use a lidocaine pain cream which seems to help.  Denies any upper extremity weakness, radicular pain, gait instability, or bladder/bowel incontinence.             Neck Disability  Neck Disability-Pain Score: 4  Neck Disability-Pain Intensity: The pain is very mild at the moment  Neck Disability-Personal Care: I need some help, but manage most of my personal  Neck Disability-Lifting: I cannot lift or carry anything at all  Neck Disability-Reading: I can hardly read at all, because of severe pain in my neck  Neck Disability-Headaches: I have severe headaches that come frequently  Neck Disability-Concentration: I have a fair degree of difficulty in concentratng when I want to  Neck Disability-work: I cannot do any work at all  Neck Disability-Driving: I cant drive my car at all  Neck Disability-Sleeping: My sleep is completely disturbed (5-7 hours sleeplessness)  Neck Disability-Recreation: I cant do any recreation activities at  all      EXAM  Vitals:    05/10/19 0911   BP: 129/84   Pulse: (!) 124   Temp: 97.7 °F (36.5 °C)       General: well developed, well nourished. no acute distress. Comfortable.   Neurologic: Awake, alert and oriented x3. Thought content appropriate.  Head: normocephalic, atraumatic    GCS: Motor: 6/Verbal: 5/Eyes: 4 GCS Total: 15  Cranial nerves: face symmetric, tongue midline, pupils equal, round, reactive to light with accomodation, extraocular muscles intact. CN II-XII grossly intact.    Sensory: response to light touch throughout  Motor Strength: Moves all extremities spontaneously with good tone. Full strength upper and lower extremities.  Bilateral interossei  4+/5. No abnormal movements seen.    Negative Sierra bilaterally  No focal numbness or weakness   Bilateral anterior neck paraspinal with mild tenderness to palpation  Gait is normal.   Sensation intact to light touch.    Heart: RRR, no cyanosis, pallor, or edema.    Lungs: normal respiratory effort  Abdomen: soft, non-tender and symmetric  Extremities: warm with no cyanosis, edema, or clubbing  Skin: warm, dry and intact. No visible rashes or lesions.        Posterior neck Surgical incision:  C/D/I without any signs of infection.  Incision is healing well.  No erythema, warmth, edema, TTP.  No drainage.  Wound edges are well approximated.    Staples removed without difficulty.          IMAGING/LABS  Cervical AP lateral x-rays dated 05/10/2019 personally reviewed and compared to previous imaging   Grossly stable previous C4-7 ACDF and subsequent posterior instrumentation for C4-T1.  No migration of hardware.  Lordosis improved.      ASSESSMENT/PLAN    53 y.o. female with a history of hypertension, migraine, fibromyalgia, depression, anxiety, previous C4-C6 ACDF by outside surgeon on 11/17/2015 with subsequent revision C6-C7 ACDF on 12/9/2016 who ultimately underwent subsequent posterior C4 to T1 posterior cervical surgery with Dr. Bean 04/26/2019 for  chronic pain and C6-7 pseudoarthrosis.  She presents for 2 weeks wound check. Patient is doing well postoperatively.  Preoperative neck pain and mid back pain has improved significantly since surgery.  She has some expected postop myofascial neck pain and muscle spasms.  Incision is healing well. Imaging stable. I have reiterated wound care, activity restrictions, and follow up appts as below.     -refilled her prescription for tizanidine p.r.n. muscle spasm.  Continue home health therapy exercises daily.  -continue using cervical bone stimulator 4 hr a day.  -May continue using lidocaine and diclofenac gel p.r.n. muscle pain.  Do not place on incision directly.  -Do not submerge incision for another 6 weeks. Pat the incision dry after your shower.  -Keep incision open to air. Turn q2h to promote appropriate wound healing.  - Cervical collar at all times.  -Continue p.r.n. stool softener and miralax to prevent constipation with pain med use.   -Increase ambulation. No heavy lifting >10lbs.   No over bending or twisting at incisional site.  Fall precautions.  -Patient has follow up with Dr. Bean in 4-6 weeks with cervical xrays.     All questions were answered. Patient was encouraged to call clinic with any future concerns prior to follow up appt. If any worsening symptoms, patient should report to ED.     Please call with any questions.    Robert Ruano PA-C  Neurosurgery      Note dictated with voice recognition software, please excuse any grammatical errors.

## 2019-05-15 RX ORDER — LIDOCAINE AND PRILOCAINE 25; 25 MG/G; MG/G
CREAM TOPICAL
Qty: 30 G | Refills: 0 | Status: SHIPPED | OUTPATIENT
Start: 2019-05-15

## 2019-05-22 ENCOUNTER — TELEPHONE (OUTPATIENT)
Dept: NEUROSURGERY | Facility: CLINIC | Age: 54
End: 2019-05-22

## 2019-05-22 NOTE — TELEPHONE ENCOUNTER
----- Message from Torrie Redmond sent at 5/20/2019  3:37 PM CDT -----  Contact: [phone] /self  Patient is requesting a call back regarding the instructions on how and when to use the lotion she was prescribed. Does she start now or wait a few days after the staples are out? Thanks

## 2019-05-30 ENCOUNTER — TELEPHONE (OUTPATIENT)
Dept: CARDIOLOGY | Facility: CLINIC | Age: 54
End: 2019-05-30

## 2019-05-30 NOTE — TELEPHONE ENCOUNTER
Ms. Acosta was informed of PA recommendations-v/u.     Patient hasn't been seen with Neurology. She will reach out to her PCP in regards to getting a referral with Neurologist.

## 2019-05-30 NOTE — TELEPHONE ENCOUNTER
----- Message from Kath Alvarez sent at 5/30/2019 11:56 AM CDT -----  No. [phone]    Patient had surgery on 4/26/19.   Her headaches have gotten worse.   She is throwing up too.   Fioricet is not helping.   Please call.

## 2019-05-30 NOTE — TELEPHONE ENCOUNTER
"Spoke with Ms Bravo, she states " My headaches has worsen since surgery - I've been vomiting. My pcp couldn't help me he told me to contact Dr. Bean. "    Ms. Bravo states her HA is a 10+ and she's been vomiting 2-3 days, but is able to keep fluids down. Fioricet isn't working. Pcp recommended she reached out to Dr. Bean due to her recent surgery. Patient does have an history of migraines - but she states her HA are worse now.    Any recommendations for patient?    Please advise.  "

## 2019-06-10 ENCOUNTER — HOSPITAL ENCOUNTER (OUTPATIENT)
Dept: RADIOLOGY | Facility: HOSPITAL | Age: 54
Discharge: HOME | End: 2019-06-10
Attending: NEUROLOGICAL SURGERY
Payer: COMMERCIAL

## 2019-06-10 ENCOUNTER — OFFICE VISIT (OUTPATIENT)
Dept: NEUROSURGERY | Facility: CLINIC | Age: 54
End: 2019-06-10
Payer: COMMERCIAL

## 2019-06-10 VITALS
SYSTOLIC BLOOD PRESSURE: 124 MMHG | HEIGHT: 56 IN | DIASTOLIC BLOOD PRESSURE: 84 MMHG | WEIGHT: 147 LBS | BODY MASS INDEX: 33.07 KG/M2 | HEART RATE: 88 BPM

## 2019-06-10 DIAGNOSIS — Z98.1 STATUS POST CERVICAL SPINAL FUSION: Primary | ICD-10-CM

## 2019-06-10 DIAGNOSIS — Z98.1 S/P CERVICAL SPINAL FUSION: ICD-10-CM

## 2019-06-10 PROCEDURE — 99024 POSTOP FOLLOW-UP VISIT: CPT | Mod: S$GLB,,, | Performed by: NEUROLOGICAL SURGERY

## 2019-06-10 PROCEDURE — 99999 PR PBB SHADOW E&M-EST. PATIENT-LVL III: ICD-10-PCS | Mod: PBBFAC,,, | Performed by: NEUROLOGICAL SURGERY

## 2019-06-10 PROCEDURE — 72040 XR CERVICAL SPINE AP LATERAL: ICD-10-PCS | Mod: 26,,, | Performed by: RADIOLOGY

## 2019-06-10 PROCEDURE — 99999 PR PBB SHADOW E&M-EST. PATIENT-LVL III: CPT | Mod: PBBFAC,,, | Performed by: NEUROLOGICAL SURGERY

## 2019-06-10 PROCEDURE — 72040 X-RAY EXAM NECK SPINE 2-3 VW: CPT | Mod: 26,,, | Performed by: RADIOLOGY

## 2019-06-10 PROCEDURE — 99024 PR POST-OP FOLLOW-UP VISIT: ICD-10-PCS | Mod: S$GLB,,, | Performed by: NEUROLOGICAL SURGERY

## 2019-06-10 PROCEDURE — 72040 X-RAY EXAM NECK SPINE 2-3 VW: CPT | Mod: TC,PN

## 2019-06-10 NOTE — PROGRESS NOTES
NEUROSURGICAL POST-OPERATIVE PROGRESS NOTE    DATE OF SERVICE:  06/10/2019      ATTENDING PHYSICIAN:  Ayaan Bean MD    SUBJECTIVE:    INTERIM HISTORY:    This is a very pleasant 53 y.o. y.o. female, who is status 6 weeks C4-T1 posterior laminectomy and instrumented fusion.  She reports significant improvement in her neck pain since the surgery.  She has been compliant with wearing her neck brace and using her bone growth stimulator.  No new weakness or numbness.  Satisfied with her neck pain improvement.         Neck Disability  Neck Disability-Pain Score: 3  Neck Disability-Pain Intensity: The pain is very mild at the moment  Neck Disability-Personal Care: I need help every day in most aspects of self care  Neck Disability-Lifting: I cannot lift or carry anything at all  Neck Disability-Reading: I cannot read at all  Neck Disability-Headaches: I have moderate headaches that come infrequently  Neck Disability-Concentration: I have a great deal of difficulty in concentrating when I want to  Neck Disability-work: I cannot do any work at all  Neck Disability-Driving: I cant drive my car at all  Neck Disability-Sleeping: My sleep is greatly disturbed (3-5 hours sleeplessness)  Neck Disability-Recreation: I cant do any recreation activities at all    OBJECTIVE:    PHYSICAL EXAMINATION:   Vitals:    06/10/19 1110   BP: 124/84   Pulse: 88       Neurosurgery Physical Exam    Ortho Exam    Neurologic Exam     Motor Exam     Strength   Strength 5/5 throughout.       Wound has healed.    DIAGNOSTIC DATA:    X-ray of the cervical spine, AP and lateral views reveals the fusion hardware is intact. No loosening of screws or migration of hardware.    ASSESMENT:    This is a 53 y.o. female who is s/p 6 weeks C4-T1 posterior laminectomy instrumented fusion with significant improvement in neck pain.    Problem List Items Addressed This Visit     None      Visit Diagnoses     Status post cervical spinal fusion    -  Primary           PLAN:  All questions answered  Can drive with the collar on  Follow-up in 6 weeks with repeat cervical x-rays        Ayaan Bean MD  Pager: [phone]

## 2019-06-14 ENCOUNTER — TELEPHONE (OUTPATIENT)
Dept: NEUROSURGERY | Facility: CLINIC | Age: 54
End: 2019-06-14

## 2019-06-14 NOTE — TELEPHONE ENCOUNTER
----- Message from Kanchan Brar sent at 6/14/2019 10:51 AM CDT -----  Contact: [phone]/self  Patient requesting to speak with you regarding her Jury Duty letter. Please advise.

## 2019-06-14 NOTE — TELEPHONE ENCOUNTER
----- Message from Kath Alvarez sent at 6/14/2019  2:00 PM CDT -----  No. [phone]   Patient returned your call.    Fax no. [phone]

## 2019-07-03 ENCOUNTER — TELEPHONE (OUTPATIENT)
Dept: ORTHOPEDICS | Facility: CLINIC | Age: 54
End: 2019-07-03

## 2019-07-03 ENCOUNTER — TELEPHONE (OUTPATIENT)
Dept: NEUROSURGERY | Facility: CLINIC | Age: 54
End: 2019-07-03

## 2019-07-03 RX ORDER — METHOCARBAMOL 750 MG/1
750 TABLET, FILM COATED ORAL
Qty: 90 TABLET | Refills: 2 | Status: SHIPPED | OUTPATIENT
Start: 2019-07-03 | End: 2019-07-13

## 2019-07-03 RX ORDER — CELECOXIB 200 MG/1
200 CAPSULE ORAL 2 TIMES DAILY
Qty: 60 CAPSULE | Refills: 3 | Status: SHIPPED | OUTPATIENT
Start: 2019-07-03 | End: 2019-12-29

## 2019-07-03 NOTE — TELEPHONE ENCOUNTER
----- Message from Ayaan Bean MD sent at 7/2/2019  1:16 PM CDT -----  Contact: [phone]/self  Please move her appointment to next week with me  ----- Message -----  From: Taryn Mayen  Sent: 7/2/2019  10:26 AM  To: Vikas Kuo Staff    Patient requesting to speak with you concerning the pain she is experiencing. Please advise.

## 2019-07-04 RX ORDER — HYDROMORPHONE HYDROCHLORIDE 2 MG/1
2 TABLET ORAL EVERY 6 HOURS PRN
Qty: 28 TABLET | Refills: 0 | Status: SHIPPED | OUTPATIENT
Start: 2019-07-04 | End: 2020-07-06

## 2019-07-09 RX ORDER — TIZANIDINE 4 MG/1
TABLET ORAL
Qty: 60 TABLET | Refills: 0 | OUTPATIENT
Start: 2019-07-09

## 2019-07-19 RX ORDER — TIZANIDINE 4 MG/1
TABLET ORAL
Qty: 60 TABLET | Refills: 0 | Status: SHIPPED | OUTPATIENT
Start: 2019-07-19 | End: 2020-07-06

## 2019-08-12 ENCOUNTER — HOSPITAL ENCOUNTER (OUTPATIENT)
Dept: RADIOLOGY | Facility: HOSPITAL | Age: 54
Discharge: HOME | End: 2019-08-12
Attending: NEUROLOGICAL SURGERY
Payer: COMMERCIAL

## 2019-08-12 ENCOUNTER — OFFICE VISIT (OUTPATIENT)
Dept: NEUROSURGERY | Facility: CLINIC | Age: 54
End: 2019-08-12
Payer: COMMERCIAL

## 2019-08-12 VITALS — WEIGHT: 147 LBS | HEIGHT: 56 IN | BODY MASS INDEX: 33.07 KG/M2

## 2019-08-12 DIAGNOSIS — Z98.1 S/P CERVICAL SPINAL FUSION: ICD-10-CM

## 2019-08-12 DIAGNOSIS — Z98.1 S/P CERVICAL SPINAL FUSION: Primary | ICD-10-CM

## 2019-08-12 PROCEDURE — 99999 PR PBB SHADOW E&M-EST. PATIENT-LVL III: CPT | Mod: PBBFAC,,, | Performed by: NEUROLOGICAL SURGERY

## 2019-08-12 PROCEDURE — 72040 X-RAY EXAM NECK SPINE 2-3 VW: CPT | Mod: TC,PN

## 2019-08-12 PROCEDURE — 3008F BODY MASS INDEX DOCD: CPT | Mod: CPTII,S$GLB,, | Performed by: NEUROLOGICAL SURGERY

## 2019-08-12 PROCEDURE — 72040 X-RAY EXAM NECK SPINE 2-3 VW: CPT | Mod: 26,,, | Performed by: RADIOLOGY

## 2019-08-12 PROCEDURE — 72040 XR CERVICAL SPINE AP LATERAL: ICD-10-PCS | Mod: 26,,, | Performed by: RADIOLOGY

## 2019-08-12 PROCEDURE — 99212 PR OFFICE/OUTPT VISIT, EST, LEVL II, 10-19 MIN: ICD-10-PCS | Mod: S$GLB,,, | Performed by: NEUROLOGICAL SURGERY

## 2019-08-12 PROCEDURE — 99212 OFFICE O/P EST SF 10 MIN: CPT | Mod: S$GLB,,, | Performed by: NEUROLOGICAL SURGERY

## 2019-08-12 PROCEDURE — 99999 PR PBB SHADOW E&M-EST. PATIENT-LVL III: ICD-10-PCS | Mod: PBBFAC,,, | Performed by: NEUROLOGICAL SURGERY

## 2019-08-12 PROCEDURE — 3008F PR BODY MASS INDEX (BMI) DOCUMENTED: ICD-10-PCS | Mod: CPTII,S$GLB,, | Performed by: NEUROLOGICAL SURGERY

## 2019-08-12 RX ORDER — TOPIRAMATE 25 MG/1
TABLET ORAL
COMMUNITY
End: 2019-08-12

## 2019-08-12 RX ORDER — CYCLOBENZAPRINE HCL 10 MG
TABLET ORAL
COMMUNITY
End: 2020-07-06

## 2019-08-12 RX ORDER — ALPRAZOLAM 1 MG/1
TABLET ORAL
COMMUNITY
End: 2019-08-12

## 2019-08-12 RX ORDER — FUROSEMIDE 40 MG/1
TABLET ORAL
COMMUNITY

## 2019-08-12 RX ORDER — AZITHROMYCIN 250 MG/1
TABLET, FILM COATED ORAL
COMMUNITY
Start: 2019-08-09 | End: 2019-08-12

## 2019-08-12 RX ORDER — IBUPROFEN 800 MG/1
TABLET ORAL
COMMUNITY
End: 2019-08-12

## 2019-08-12 RX ORDER — HYDROCODONE BITARTRATE AND ACETAMINOPHEN 10; 325 MG/1; MG/1
TABLET ORAL
COMMUNITY

## 2019-08-12 RX ORDER — ZOLPIDEM TARTRATE 10 MG/1
10 TABLET ORAL
COMMUNITY
Start: 2019-08-09

## 2019-08-12 NOTE — PROGRESS NOTES
NEUROSURGICAL PROGRESS NOTE    DATE OF SERVICE:  2019    ATTENDING PHYSICIAN:  Ayaan Bean MD    SUBJECTIVE:    INTERIM HISTORY:    This is a very pleasant 54 y.o. female, who is status more than 3 months C4 to T1 posterior laminectomy and fusion. Doing well with improved neck pain. Some right posterior shoulder pain. No new weakness or numbness. Has been compliant with wearing her cervical collar and using her bone growth stimulator. Uses lidocaine patch for pain. Does her home PT exercises.               PAST MEDICAL HISTORY:  Active Ambulatory Problems     Diagnosis Date Noted    HTN (hypertension) 2014    Renal insufficiency 2014    SOB (shortness of breath) 2014    Renal stones 2014    Pseudoarthrosis of cervical spine 2019    Status post cervical spinal fusion 06/10/2019     Resolved Ambulatory Problems     Diagnosis Date Noted    Spinal stenosis of cervical region 2015    Cervical disc disorder with radiculopathy of mid-cervical region 2016    Cervical disc disorder at C6-C7 level with radiculopathy 2016     Past Medical History:   Diagnosis Date    Anxiety     Arthritis     Bronchitis     Depression     Fibromyalgia     Hypertension     Kidney stones     Migraine     Recurrent UTI     S/P Botox injection        PAST SURGICAL HISTORY:  Past Surgical History:   Procedure Laterality Date    CERVICAL FUSION       SECTION  , ,     CYSTOSCOPY W/ URETERAL STENT PLACEMENT Right     10 times    FUSION CERVICAL ANTERIOR C6-C7, DISCECTOMY C6-C7 N/A 2016    Performed [facility] at [facility] OR    FUSION CERVICAL ANTERIOR WITH NAVIGATION-(ACDF)- C4-5  C5-6 N/A 2015    Performed by [facility] at [facility] OR    HARVESTING-BONE GRAFT N/A 2015    Per[facility] at [facility] OR    HARVESTING-BONE GRAFT - iliac crest Right 2016    [facility]    LAMINECTOMY, SPINE, CERVICAL, WITH FUSION C4-T1 Laminectomy and Posterior Instrumented Fusion N/A 4/26/2019    Performed by [facility] Bean MD at [facility] OR    LITHOTRIPSY Right 8/2012 to present    right side 9 times    NEPHRECTOMY Right 9/2014    Robotic assisted    pain pump  2007    removed 2009    TONSILLECTOMY  1989    URETER SURGERY Right 3/22/2013 & 2014    Robotic both surgeries    WRIST FRACTURE SURGERY         SOCIAL HISTORY:   Social History     Socioeconomic History    Marital status:      Spouse name: Not on file    Number of children: Not on file    Years of education: Not on file    Highest education level: Not on file   Occupational History    Not on file   Social Needs    Financial resource strain: Not on file    Food insecurity:     Worry: Not on file     Inability: Not on file    Transportation needs:     Medical: Not on file     Non-medical: Not on file   Tobacco Use    Smoking status: Never Smoker    Smokeless tobacco: Never Used   Substance and Sexual Activity    Alcohol use: No    Drug use: No    Sexual activity: Not on file   Lifestyle    Physical activity:     Days per week: Not on file     Minutes per session: Not on file    Stress: Not on file   Relationships    Social connections:     Talks on phone: Not on file     Gets together: Not on file     Attends Tenriism service: Not on file     Active member of club or organization: Not on file     Attends meetings of clubs or organizations: Not on file     Relationship status: Not on file   Other Topics Concern    Not on file   Social History Narrative    Not on file       FAMILY HISTORY:  Family History   Problem Relation Age of Onset    Hyperlipidemia Mother     Diabetes Father     Heart disease Father     Hypertension Father     Kidney disease Father     Hyperlipidemia Brother        CURRENTS MEDICATIONS:  Current Outpatient Medications on File Prior to Visit   Medication Sig Dispense  Refill    celecoxib (CELEBREX) 200 MG capsule Take 1 capsule (200 mg total) by mouth 2 (two) times daily. 60 capsule 3    clonazePAM (KLONOPIN) 1 MG tablet Take 1 mg by mouth 2 (two) times daily as needed for Anxiety.      cpm-phenyleph-acetaminophen 4- mg Tab Take 1 tablet by mouth 3 (three) times daily as needed.      cyclobenzaprine (FLEXERIL) 10 MG tablet cyclobenzaprine 10 mg tablet      fluticasone (FLONASE) 50 mcg/actuation nasal spray   4    furosemide (LASIX) 40 MG tablet furosemide 40 mg tablet      HYDROcodone-acetaminophen (NORCO)  mg per tablet hydrocodone 10 mg-acetaminophen 325 mg tablet      HYDROmorphone (DILAUDID) 2 MG tablet Take 1 tablet (2 mg total) by mouth every 6 (six) hours as needed for Pain. 28 tablet 0    lidocaine-prilocaine (EMLA) cream APPLY A SMALL AMOUNT (2-3GRAMS) TOPICALLY TO AFFECTED AREA  3 TIMES A DAY AS DIRECTED. 30 g 0    potassium chloride SA (K-DUR,KLOR-CON) 20 MEQ tablet   4    tiZANidine (ZANAFLEX) 4 MG tablet TAKE 1 BY MOUTH EVERY 6 HOURS AS NEEDED. 60 tablet 0    zolpidem (AMBIEN) 10 mg Tab Take 10 mg by mouth.      butalbital-acetaminophen-caffeine -40 mg (FIORICET, ESGIC) -40 mg per tablet Take 1 tablet by mouth every 6 (six) hours as needed.      docusate sodium (COLACE) 100 MG capsule Take 1 capsule (100 mg total) by mouth 2 (two) times daily as needed. 40 capsule     HYDROCORT/MIN OIL/PETROLAT,WHT (HYDROCORTISONE-MIN OIL-WHT PET) 1 % Oint APPLY SMALL AMOUNT (1-2 GRAMS) TO AFFECTED AREA 2-4 TIMES DAILY AS DIRECTED FOR REDNESS RELIEF.  0    ondansetron (ZOFRAN-ODT) 8 MG TbDL DISSOLVE 1 TABLET BY MOUTH EVERY 12 HOURS AS NEEDED 20 tablet 1    promethazine (PHENERGAN) 25 MG tablet   1    RELISTOR 150 mg Tab   4    [DISCONTINUED] ALPRAZolam (XANAX) 1 MG tablet alprazolam 1 mg tablet      [DISCONTINUED] azithromycin (Z-LANDRY) 250 MG tablet Take 2 tablets (500mg) once today (on Day1), followed by 1 tablet (250mg) once daily on Day 2  through 5.      [DISCONTINUED] ibuprofen (ADVIL,MOTRIN) 800 MG tablet ibuprofen 800 mg tablet      [DISCONTINUED] topiramate (TOPAMAX) 25 MG tablet topiramate 25 mg tablet       No current facility-administered medications on file prior to visit.        ALLERGIES:  Review of patient's allergies indicates:   Allergen Reactions    Adhesive Hives and Rash    Antihistamines - alkylamine Rash     hives    Demerol [meperidine] Other (See Comments)     Weakness, lethargy    Levaquin [levofloxacin] Hives    Macrobid [nitrofurantoin monohyd/m-cryst] Hives    Macrodantin [nitrofurantoin macrocrystalline]     Morphine Itching     Pill form    Penicillins Rash    Sulfa (sulfonamide antibiotics) Rash       REVIEW OF SYSTEMS:  Review of Systems   Constitutional: Negative for diaphoresis, fever and weight loss.   Respiratory: Negative for shortness of breath.    Cardiovascular: Negative for chest pain.   Gastrointestinal: Negative for blood in stool.   Genitourinary: Negative for hematuria.   Endo/Heme/Allergies: Does not bruise/bleed easily.   All other systems reviewed and are negative.        OBJECTIVE:    PHYSICAL EXAMINATION:   There were no vitals filed for this visit.    Physical Exam:    Psych/Behavior: She is oriented to person, place, and time.     Neurological:        DTRs: Tricep reflexes are 2+ on the right side and 2+ on the left side. Bicep reflexes are 2+ on the right side and 2+ on the left side. Brachioradialis reflexes are 2+ on the right side and 2+ on the left side. Patellar reflexes are 2+ on the right side and 2+ on the left side. Achilles reflexes are 2+ on the right side and 2+ on the left side.       Back Exam     Muscle Strength   Right Quadriceps:  5/5   Left Quadriceps:  5/5   Right Hamstrings:  5/5   Left Hamstrings:  5/5             SI joint:   Palpation at the right and left SI joints not painful  BINU test is negative bilaterally  Gaenslen test is negative bilaterally  Thigh thrust test  is negative bilaterally    Neurologic Exam     Mental Status   Oriented to person, place, and time.   Speech: speech is normal   Level of consciousness: alert    Cranial Nerves   Cranial nerves II through XII intact.     Motor Exam   Muscle bulk: normal  Overall muscle tone: normal    Strength   Right deltoid: 5/5  Left deltoid: 5/5  Right biceps: 5/5  Left biceps: 5/5  Right triceps: 5/5  Left triceps: 5/5  Right wrist flexion: 5/5  Left wrist flexion: 5/5  Right wrist extension: 5/5  Left wrist extension: 5/5  Right interossei: 5/5  Left interossei: 5/5  Right iliopsoas: 5/5  Left iliopsoas: 5/5  Right quadriceps: 5/5  Left quadriceps: 5/5  Right hamstrin/5  Left hamstrin/5  Right anterior tibial: 5/5  Left anterior tibial: 5/5  Right posterior tibial: 5/5  Left posterior tibial: 5/5  Right peroneal: 5/5  Left peroneal: 5/5  Right gastroc: 5/5  Left gastroc: 5/5    Sensory Exam   Light touch normal.   Pinprick normal.     Gait, Coordination, and Reflexes     Gait  Gait: normal    Coordination   Finger to nose coordination: normal  Tandem walking coordination: normal    Reflexes   Right brachioradialis: 2+  Left brachioradialis: 2+  Right biceps: 2+  Left biceps: 2+  Right triceps: 2+  Left triceps: 2+  Right patellar: 2+  Left patellar: 2+  Right achilles: 2+  Left achilles: 2+  Right plantar: normal  Left plantar: normal  Right Tyson: absent  Left Tyson: absent  Right ankle clonus: absent  Left ankle clonus: absent        DIAGNOSTIC DATA:  I personally interpreted the following imaging:   Cervical XR shows correct alignment, no migration of hardware.     ASSESMENT:  This is a 54 y.o. female with     Problem List Items Addressed This Visit     None      Visit Diagnoses     S/P cervical spinal fusion    -  Primary            PLAN:  FU in 3 months  OK to DC collar  Massage therapy for myofascial neck pain          Ayaan Bean MD  Cell:[phone]

## 2019-08-23 ENCOUNTER — TELEPHONE (OUTPATIENT)
Dept: OBSTETRICS AND GYNECOLOGY | Facility: CLINIC | Age: 54
End: 2019-08-23

## 2019-08-23 RX ORDER — NITROFURANTOIN 25; 75 MG/1; MG/1
100 CAPSULE ORAL 2 TIMES DAILY
Qty: 6 CAPSULE | Refills: 0 | Status: CANCELLED | OUTPATIENT
Start: 2019-08-23 | End: 2019-08-26

## 2019-08-23 RX ORDER — DOXYCYCLINE HYCLATE 100 MG
100 TABLET ORAL 2 TIMES DAILY
Qty: 14 TABLET | Refills: 0 | Status: SHIPPED | OUTPATIENT
Start: 2019-08-23 | End: 2019-08-30

## 2019-08-23 NOTE — TELEPHONE ENCOUNTER
----- Message from Taryn Mayen sent at 8/23/2019  1:31 PM CDT -----  Contact: [phone]  Type:  Sooner Appointment Request     Caller is requesting a sooner appointment.  Caller declined first available appointment listed below.  Caller will not accept being placed on the waitlist and is requesting a message be sent to doctor.  Name of Caller: pt  When is the first available appointment? 9/10  Symptoms or Reason: Annual   Would the patient rather a call back or a response via [facility]? Call back  Best Call Back Number: [phone]  Additional Information:

## 2019-08-23 NOTE — TELEPHONE ENCOUNTER
I don't typically use doxy to teat UTIs. I will send it in BID for 7 days and if not better in 2-3 days then she needs to come in and give a urine sample

## 2019-08-23 NOTE — TELEPHONE ENCOUNTER
Eft message for pt notifying rx was sent in and if the medicine doesn't work within 2-3 days, pt would need to come in and give a urine sample

## 2019-08-23 NOTE — TELEPHONE ENCOUNTER
That is not an antibiotic. The options would be bactrim, keflex (which is in the pencillin family), ciprofloxacin, macrobid or levaquin

## 2019-08-23 NOTE — TELEPHONE ENCOUNTER
Pt state she is having burning when urinating and some itching, with the urge to urinate often. Pt state she can not wait until Monday to get medication. Pt is allergic to macrobid. Can you send in a rx for pt.

## 2019-10-14 ENCOUNTER — TELEPHONE (OUTPATIENT)
Dept: NEUROSURGERY | Facility: CLINIC | Age: 54
End: 2019-10-14

## 2019-10-14 NOTE — TELEPHONE ENCOUNTER
----- Message from Ayaan Bean MD sent at 10/14/2019  8:13 AM CDT -----  Contact: self/[phone]  She is more than 3 months out after surgery. She needs to ask her PCP  ----- Message -----  From: Mike Rodriguez  Sent: 10/14/2019   8:05 AM CDT  To: Vikas Kuo Staff    She needs a letter to get out of jury duty.

## 2019-10-16 ENCOUNTER — TELEPHONE (OUTPATIENT)
Dept: NEUROSURGERY | Facility: CLINIC | Age: 54
End: 2019-10-16

## 2019-10-16 NOTE — TELEPHONE ENCOUNTER
----- Message from Ayaan Bean MD sent at 10/14/2019  8:13 AM CDT -----  Contact: self/[phone]  She is more than 3 months out after surgery. She needs to ask her PCP  ----- Message -----  From: Mike Rodriguez  Sent: 10/14/2019   8:05 AM CDT  To: Vikas Kuo Staff    She needs a letter to get out of jury duty.

## 2019-11-15 ENCOUNTER — TELEPHONE (OUTPATIENT)
Dept: NEUROSURGERY | Facility: CLINIC | Age: 54
End: 2019-11-15

## 2019-11-15 DIAGNOSIS — Z98.1 S/P CERVICAL SPINAL FUSION: Primary | ICD-10-CM

## 2019-11-18 ENCOUNTER — TELEPHONE (OUTPATIENT)
Dept: NEUROSURGERY | Facility: CLINIC | Age: 54
End: 2019-11-18

## 2019-11-18 NOTE — TELEPHONE ENCOUNTER
----- Message from Jody Flowers sent at 11/18/2019  9:13 AM CST -----  Contact: Pt  PT called to cancel/reschedule her appts today due to being ill and would like a call back at [phone]

## 2019-12-11 ENCOUNTER — OFFICE VISIT (OUTPATIENT)
Dept: NEUROSURGERY | Facility: CLINIC | Age: 54
End: 2019-12-11
Payer: COMMERCIAL

## 2019-12-11 ENCOUNTER — HOSPITAL ENCOUNTER (OUTPATIENT)
Dept: RADIOLOGY | Facility: HOSPITAL | Age: 54
Discharge: HOME | End: 2019-12-11
Attending: NEUROLOGICAL SURGERY
Payer: COMMERCIAL

## 2019-12-11 VITALS
HEIGHT: 56 IN | BODY MASS INDEX: 33.07 KG/M2 | WEIGHT: 147 LBS | DIASTOLIC BLOOD PRESSURE: 94 MMHG | HEART RATE: 107 BPM | SYSTOLIC BLOOD PRESSURE: 140 MMHG

## 2019-12-11 DIAGNOSIS — M47.812 SPONDYLOSIS OF CERVICAL REGION WITHOUT MYELOPATHY OR RADICULOPATHY: ICD-10-CM

## 2019-12-11 DIAGNOSIS — M79.18 MYOFASCIAL PAIN SYNDROME, CERVICAL: ICD-10-CM

## 2019-12-11 DIAGNOSIS — R51.9 HEADACHE DISORDER: ICD-10-CM

## 2019-12-11 DIAGNOSIS — Z98.1 S/P CERVICAL SPINAL FUSION: ICD-10-CM

## 2019-12-11 DIAGNOSIS — Z98.1 STATUS POST CERVICAL SPINAL FUSION: Primary | ICD-10-CM

## 2019-12-11 PROCEDURE — 99999 PR PBB SHADOW E&M-EST. PATIENT-LVL V: ICD-10-PCS | Mod: PBBFAC,,, | Performed by: PHYSICIAN ASSISTANT

## 2019-12-11 PROCEDURE — 99999 PR PBB SHADOW E&M-EST. PATIENT-LVL V: CPT | Mod: PBBFAC,,, | Performed by: PHYSICIAN ASSISTANT

## 2019-12-11 PROCEDURE — 99024 POSTOP FOLLOW-UP VISIT: CPT | Mod: S$GLB,,, | Performed by: PHYSICIAN ASSISTANT

## 2019-12-11 PROCEDURE — 99024 PR POST-OP FOLLOW-UP VISIT: ICD-10-PCS | Mod: S$GLB,,, | Performed by: PHYSICIAN ASSISTANT

## 2019-12-11 PROCEDURE — 72040 X-RAY EXAM NECK SPINE 2-3 VW: CPT | Mod: 26,,, | Performed by: RADIOLOGY

## 2019-12-11 PROCEDURE — 72040 XR CERVICAL SPINE AP LATERAL: ICD-10-PCS | Mod: 26,,, | Performed by: RADIOLOGY

## 2019-12-11 PROCEDURE — 72040 X-RAY EXAM NECK SPINE 2-3 VW: CPT | Mod: TC,PN

## 2019-12-11 NOTE — PROGRESS NOTES
Established Patient    SUBJECTIVE:     History of Present Illness:  Karla Bravo is a 54 y.o. female who presents for neurosurgical re-evaluation; she is 7.5 months status post C4 to T1 posterior laminectomy and fusion.  Patient states she is doing well overall.  Intermittently, she will have bilateral trapezius muscular pain.  She is using lidocaine patches p.r.n. severe pain.  She is doing her daily home cervical exercises 2-3x a day.  Massage therapy helps significantly.  She still using her bone cervical stimulator 4 hr a day.  She still sleeping in a recliner chair at this time.  Denies any radicular arm pain, upper extremity weakness, bladder/bowel incontinence, or new complaints.  She does have a history of chronic headaches and does not feel like her Fioricet is helping lately; she has not seen her neurologist since 2017.        Interval History    Karla Bravo is 53 y.o. female with a history of hypertension, migraine, fibromyalgia, depression, anxiety, previous C4-C6 ACDF by outside surgeon on 11/17/2015 with subsequent revision C6-C7 ACDF on 12/9/2016 who ultimately underwent subsequent posterior C4 to T1 posterior cervical surgery with Dr. Bean 04/26/2019 for chronic pain and C6-7 pseudoarthrosis.             Neck Disability  Neck Disability-Pain Score: 6  Neck Disability-Pain Intensity: The pain is moderate at the moment  Neck Disability-Personal Care: I can look after myself but it causes extra pain  Neck Disability-Lifting: I can lift very light weights  Neck Disability-Reading: I can read as much as I want to, with slight pain in my neck  Neck Disability-Headaches: I have severe headaches that come frequently  Neck Disability-Concentration: I have a fair degree of difficulty in concentratng when I want to  Neck Disability-work: I can do my usual work, but no more  Neck Disability-Driving: I can drive my car as long as I want with slight pain in my neck  Neck Disability-Sleeping: My  sleep is greatly disturbed (3-5 hours sleeplessness)  Neck Disability-Recreation: I cant do any recreation activities at all        Review of patient's allergies indicates:   Allergen Reactions    Adhesive Hives and Rash    Antihistamines - alkylamine Rash     hives    Demerol [meperidine] Other (See Comments)     Weakness, lethargy    Levaquin [levofloxacin] Hives    Macrobid [nitrofurantoin monohyd/m-cryst] Hives    Macrodantin [nitrofurantoin macrocrystalline]     Morphine Itching     Pill form    Penicillins Rash    Sulfa (sulfonamide antibiotics) Rash       Current Outpatient Medications   Medication Sig Dispense Refill    butalbital-acetaminophen-caffeine -40 mg (FIORICET, ESGIC) -40 mg per tablet Take 1 tablet by mouth every 6 (six) hours as needed.      celecoxib (CELEBREX) 200 MG capsule Take 1 capsule (200 mg total) by mouth 2 (two) times daily. 60 capsule 3    clonazePAM (KLONOPIN) 1 MG tablet Take 1 mg by mouth 2 (two) times daily as needed for Anxiety.      cpm-phenyleph-acetaminophen 4- mg Tab Take 1 tablet by mouth 3 (three) times daily as needed.      cyclobenzaprine (FLEXERIL) 10 MG tablet cyclobenzaprine 10 mg tablet      docusate sodium (COLACE) 100 MG capsule Take 1 capsule (100 mg total) by mouth 2 (two) times daily as needed. 40 capsule     fluticasone (FLONASE) 50 mcg/actuation nasal spray   4    furosemide (LASIX) 40 MG tablet furosemide 40 mg tablet      HYDROcodone-acetaminophen (NORCO)  mg per tablet hydrocodone 10 mg-acetaminophen 325 mg tablet      HYDROCORT/MIN OIL/PETROLAT,WHT (HYDROCORTISONE-MIN OIL-WHT PET) 1 % Oint APPLY SMALL AMOUNT (1-2 GRAMS) TO AFFECTED AREA 2-4 TIMES DAILY AS DIRECTED FOR REDNESS RELIEF.  0    HYDROmorphone (DILAUDID) 2 MG tablet Take 1 tablet (2 mg total) by mouth every 6 (six) hours as needed for Pain. 28 tablet 0    lidocaine-prilocaine (EMLA) cream APPLY A SMALL AMOUNT (2-3GRAMS) TOPICALLY TO AFFECTED AREA  3  TIMES A DAY AS DIRECTED. 30 g 0    ondansetron (ZOFRAN-ODT) 8 MG TbDL DISSOLVE 1 TABLET BY MOUTH EVERY 12 HOURS AS NEEDED 20 tablet 1    potassium chloride SA (K-DUR,KLOR-CON) 20 MEQ tablet   4    promethazine (PHENERGAN) 25 MG tablet   1    RELISTOR 150 mg Tab   4    tiZANidine (ZANAFLEX) 4 MG tablet TAKE 1 BY MOUTH EVERY 6 HOURS AS NEEDED. 60 tablet 0    zolpidem (AMBIEN) 10 mg Tab Take 10 mg by mouth.       No current facility-administered medications for this visit.        Past Medical History:   Diagnosis Date    Anxiety     Arthritis     Bronchitis     related to sinuses    Depression     Fibromyalgia     Hypertension     said takes BP med PRN    Kidney stones     right side    Migraine     Recurrent UTI     S/P Botox injection     16 for Migraines     Past Surgical History:   Procedure Laterality Date    CERVICAL FUSION      CERVICAL LAMINECTOMY WITH SPINAL FUSION N/A 2019    Procedure: LAMINECTOMY, SPINE, CERVICAL, WITH FUSION C4-T1 Laminectomy and Posterior Instrumented Fusion;  Surgeon: Ayaan Bean MD;  Location: [facility];  Service: Neurosurgery;  Laterality: N/A;  Procedure: C4-T1 Laminectomy and Posterior Instrumented Fusion  Surgery Time: 3 Hr  LOS: 2-3  Anesthesia: General   Blood: Type & Screen  Radiology: C- arm  Brace: [place]  Bed: Regular Bed Chest Rolls       SECTION  , ,     CYSTOSCOPY W/ URETERAL STENT PLACEMENT Right     10 times    LITHOTRIPSY Right 2012 to present    right side 9 times    NEPHRECTOMY Right 2014    Robotic assisted    pain pump  2007    removed     TONSILLECTOMY  1989    URETER SURGERY Right 3/22/2013 & 2014    Robotic both surgeries    WRIST FRACTURE SURGERY       Family History     Problem Relation (Age of Onset)    Diabetes Father    Heart disease Father    Hyperlipidemia Mother, Brother    Hypertension Father    Kidney disease Father        Social History     Socioeconomic History    Marital status:       Spouse name: Not on file    Number of children: Not on file    Years of education: Not on file    Highest education level: Not on file   Occupational History    Not on file   Social Needs    Financial resource strain: Not on file    Food insecurity:     Worry: Not on file     Inability: Not on file    Transportation needs:     Medical: Not on file     Non-medical: Not on file   Tobacco Use    Smoking status: Never Smoker    Smokeless tobacco: Never Used   Substance and Sexual Activity    Alcohol use: No    Drug use: No    Sexual activity: Not on file   Lifestyle    Physical activity:     Days per week: Not on file     Minutes per session: Not on file    Stress: Not on file   Relationships    Social connections:     Talks on phone: Not on file     Gets together: Not on file     Attends [employer] service: Not on file     Active member of club or organization: Not on file     Attends meetings of clubs or organizations: Not on file     Relationship status: Not on file   Other Topics Concern    Not on file   Social History Narrative    Not on file       Review of Systems:  Review of Systems    Constitutional: no fever, chills or night sweats. No changes in weight   Eyes: no visual changes   ENT: no nasal congestion or sore throat   Respiratory: no cough or shortness of breath   Cardiovascular: no chest pain or palpitations   Gastrointestinal: no nausea or vomiting   Genitourinary: no hematuria or dysuria   Integument/Breast: no rash or pruritis   Hematologic/Lymphatic: no easy bruising or lymphadenopathy   Musculoskeletal: no arthralgias or myalgias.  Positive for intermittent bilateral trapezius pain.  Neurological: no seizures or tremors. No weakness or paresthesia.  Positive for intermittent chronic frontal headaches.  Behavioral/Psych: no auditory or visual hallucinations   Endocrine: no heat or cold intolerance     OBJECTIVE:     Vital Signs  Pulse: 107  BP: (!) 140/94  Pain Score:    "6  Height: 4' 8" (142.2 cm)  Weight: 66.7 kg (147 lb)  Body mass index is 32.96 kg/m².    Physical Exam:  Neurosurgery Physical Exam    General: well developed, well nourished, no distress.  Comfortable.  Neurologic: Awake, alert and oriented x3. Thought content appropriate.  GCS: Motor: 6/Verbal: 5/Eyes: 4 GCS Total: 15  Cranial nerves: II-XII grossly intact. PERRLA. EOMI without nystagmus. Face symmetric and sensation intact to light touch, tongue midline, shoulder shrug symmetric bilaterally.  Hearing equal bilaterally to finger rub. Palate and uvula rise and fall normally in midline.    Language: no aphasia  Speech: no dysarthria   Neck: supple, without obvious masses    Sensory: intact to light touch B/L UE and LE  Motor Strength: Moves all extremities spontaneously with good tone. Full strength upper and lower extremities. No abnormal movements seen.    Strength  Deltoids Triceps Biceps Wrist Extension Wrist Flexion Hand    Upper: R 5/5 5/5 5/5 5/5 5/5 5/5    L 5/5 5/5 5/5 5/5 5/5 5/5     Iliopsoas Quadriceps Knee  Flexion Tibialis  anterior Gastro- cnemius EHL   Lower: R 5/5 5/5 5/5 5/5 5/5 5/5    L 5/5 5/5 5/5 5/5 5/5 5/5     Interossi muscle strength- intact    DTR's - 1 + and symmetric in UE and LE  Tyson's - Negative        Lhermitte's - Negative  Spurlings-   Negative         Gait - normal      Tandem Gait - No difficulty    Able to walk/stand on heels & toes  Cervical ROM - limited; no pain with all ROM   No focal numbness or weakness  No midline or paraspinal tenderness to palpation  No difficulty transitioning from seated to standing position or vice versa.  ENT: normal hearing with finger rub  Heart: RRR, no cyanosis, pallor, or edema.   Lungs- normal respiratory effort  Abdomen-  soft, symmetric and nontender  Skin: grossly intact in all 4 extremities without obvious rashes or lesions  Extremities: warm with no cyanosis or edema, or clubbing  Pulses: palpable distal pulses      Posture-  No " obvious kyphosis with standing posture.  With bending posture, no obvious scapula wing    Previous anterior and posterior neck incisions are well healed      Diagnostic Results:  All imaging personally reviewed    Cervical AP lateral x-rays dated 12/11/2019  Stable postoperative changes.  C4-7 ACDF present.  Posterior C4-T1 fusion.  No migration of hardware screws.  ASSESSMENT/PLAN:     54-year-old female who is 7 months status post posterior C4 to T1 posterior cervical surgery with Dr. Bean.    She is doing very well overall. She has intermittent bilateral trapezius pain which is secondary to myofascial pain syndrome.  Recommended continuing aggressive daily neck physical therapy for strengthening stretches.  Recommending continuing massage therapy p.r.n..    Continue bone stimulator until and of battery life.    Patient will follow-up Dr. Bean in 6 months for 1 year follow-up.    Referral to Neurology for management chronic headaches/migraine.        All imaging were reviewed with patient. All questions were answered.  Patient verbalized understanding and agreed with the above plan of care.  Patient was encouraged to call clinic with any future concerns prior to follow up appt.     Please call with any questions.        Robert Ruano PA-C  Neurosurgery          Note dictated with voice recognition software, please excuse any grammatical errors.

## 2019-12-29 RX ORDER — CELECOXIB 200 MG/1
CAPSULE ORAL
Qty: 60 CAPSULE | Refills: 3 | Status: SHIPPED | OUTPATIENT
Start: 2019-12-29 | End: 2020-07-06

## 2020-04-07 ENCOUNTER — TELEPHONE (OUTPATIENT)
Dept: NEUROSURGERY | Facility: CLINIC | Age: 55
End: 2020-04-07

## 2020-04-07 NOTE — TELEPHONE ENCOUNTER
Spoke to the patient she is requesting a note stating she can not work due to her neck, back and headaches.

## 2020-04-07 NOTE — TELEPHONE ENCOUNTER
----- Message from Rahul Kennedy sent at 4/7/2020  1:43 PM CDT -----  Contact: Pt  Pt called and would like the nurse to call her back    Pt needs a note from you    Pt can be reached at [phone]

## 2020-04-08 ENCOUNTER — TELEPHONE (OUTPATIENT)
Dept: NEUROSURGERY | Facility: CLINIC | Age: 55
End: 2020-04-08

## 2020-04-08 NOTE — TELEPHONE ENCOUNTER
----- Message from Geovanna Srivastava sent at 4/8/2020 12:22 PM CDT -----  Pt stated she missed a call and asked for another call back.      Pt contact # [phone].      Thanks

## 2020-04-09 ENCOUNTER — TELEPHONE (OUTPATIENT)
Dept: PULMONOLOGY | Facility: CLINIC | Age: 55
End: 2020-04-09

## 2020-04-09 ENCOUNTER — TELEPHONE (OUTPATIENT)
Dept: NEUROSURGERY | Facility: CLINIC | Age: 55
End: 2020-04-09

## 2020-04-09 NOTE — TELEPHONE ENCOUNTER
----- Message from Donna Correa sent at 4/9/2020  1:41 PM CDT -----  Contact: Self [phone]  Patient is returning your call.  Please advise.

## 2020-04-09 NOTE — TELEPHONE ENCOUNTER
Spoke with patient. Patient stated that she was attempting to reach her neurosurgeon Dr. Ayaan Bean not Dr. Lee. Patient said that she would call her neurosurgeon.----- Message from Sri Dao sent at 4/9/2020 10:59 AM CDT -----  Contact: pt   Pt is returning a missed call from the office. Pt stated that she special needs and sometimes it's hard to get to the phone in time. If pt doesn't  the first time  please call right back     Pt stated this is the number the note can be sent to [phone]. The note must include pt social security number      Pt can be reached at [phone]

## 2020-07-02 ENCOUNTER — TELEPHONE (OUTPATIENT)
Dept: NEUROSURGERY | Facility: CLINIC | Age: 55
End: 2020-07-02

## 2020-07-02 DIAGNOSIS — Z98.1 STATUS POST CERVICAL SPINAL FUSION: Primary | ICD-10-CM

## 2020-07-06 ENCOUNTER — OFFICE VISIT (OUTPATIENT)
Dept: NEUROSURGERY | Facility: CLINIC | Age: 55
End: 2020-07-06
Payer: COMMERCIAL

## 2020-07-06 ENCOUNTER — HOSPITAL ENCOUNTER (OUTPATIENT)
Dept: RADIOLOGY | Facility: HOSPITAL | Age: 55
Discharge: HOME | End: 2020-07-06
Attending: NEUROLOGICAL SURGERY
Payer: COMMERCIAL

## 2020-07-06 ENCOUNTER — TELEPHONE (OUTPATIENT)
Dept: NEUROSURGERY | Facility: CLINIC | Age: 55
End: 2020-07-06

## 2020-07-06 DIAGNOSIS — G89.4 CHRONIC PAIN SYNDROME: ICD-10-CM

## 2020-07-06 DIAGNOSIS — Z98.1 S/P CERVICAL SPINAL FUSION: Primary | ICD-10-CM

## 2020-07-06 DIAGNOSIS — Z98.1 STATUS POST CERVICAL SPINAL FUSION: Primary | ICD-10-CM

## 2020-07-06 DIAGNOSIS — Z98.1 S/P CERVICAL SPINAL FUSION: ICD-10-CM

## 2020-07-06 PROCEDURE — 72052 X-RAY EXAM NECK SPINE 6/>VWS: CPT | Mod: 26,,, | Performed by: RADIOLOGY

## 2020-07-06 PROCEDURE — 72052 XR CERVICAL SPINE 5 VIEW WITH FLEX AND EXT: ICD-10-PCS | Mod: 26,,, | Performed by: RADIOLOGY

## 2020-07-06 PROCEDURE — 99999 PR PBB SHADOW E&M-EST. PATIENT-LVL III: ICD-10-PCS | Mod: PBBFAC,,, | Performed by: NEUROLOGICAL SURGERY

## 2020-07-06 PROCEDURE — 99213 PR OFFICE/OUTPT VISIT, EST, LEVL III, 20-29 MIN: ICD-10-PCS | Mod: S$GLB,,, | Performed by: NEUROLOGICAL SURGERY

## 2020-07-06 PROCEDURE — 99213 OFFICE O/P EST LOW 20 MIN: CPT | Mod: S$GLB,,, | Performed by: NEUROLOGICAL SURGERY

## 2020-07-06 PROCEDURE — 99999 PR PBB SHADOW E&M-EST. PATIENT-LVL III: CPT | Mod: PBBFAC,,, | Performed by: NEUROLOGICAL SURGERY

## 2020-07-06 PROCEDURE — 72052 X-RAY EXAM NECK SPINE 6/>VWS: CPT | Mod: TC,FY

## 2020-07-06 RX ORDER — BACLOFEN 10 MG/1
5 TABLET ORAL 3 TIMES DAILY
Qty: 45 TABLET | Refills: 11 | Status: SHIPPED | OUTPATIENT
Start: 2020-07-06 | End: 2021-07-06

## 2020-07-06 RX ORDER — CELECOXIB 100 MG/1
100 CAPSULE ORAL 2 TIMES DAILY
Qty: 60 CAPSULE | Refills: 3 | Status: SHIPPED | OUTPATIENT
Start: 2020-07-06
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
## 2024-11-04 NOTE — TELEPHONE ENCOUNTER
----- Message from Ayaan Bean MD sent at 3/28/2019  5:02 PM CDT -----  Contact: [phone]/self  Please write a letter for her request and I will sign it on Monday.    Thanks      ----- Message -----  From: Taryn Mayen  Sent: 3/28/2019   4:30 PM  To: [facility] Staff    Patient is requesting a doctor's note to excuse her from jury duty. Scheduled for 4/9/19 @ 9am. Judge Sergio Herman. Fax [phone].     Stacia Dejesus, PA

## (undated) DEVICE — GLOVE PROTEXIS HYDROGEL SZ7

## (undated) DEVICE — BIT DRILL 3.5MM SCREW

## (undated) DEVICE — DRESSING TELFA PAD N ADH 2X3

## (undated) DEVICE — DRAPE LEICA MICROSCOPE 48X120

## (undated) DEVICE — GAUZE SPONGE 4X4 12PLY

## (undated) DEVICE — SEE MEDLINE ITEM 157150

## (undated) DEVICE — SUT 2-0 ETHILON 18 FS

## (undated) DEVICE — KIT SPINAL PATIENT CARE JACK

## (undated) DEVICE — SEE MEDLINE ITEM 146292

## (undated) DEVICE — DRESSING ABSRBNT ISLAND 3.6X8

## (undated) DEVICE — ALCOHOL 70% ISOP W/GREEN 16OZ

## (undated) DEVICE — BLADE MILL BONE MEDIUM

## (undated) DEVICE — SUT MCRYL PLUS 4-0 PS2 27IN

## (undated) DEVICE — SUT VICRYL PLUS 0 CT1 18IN

## (undated) DEVICE — ELECTRODE REM PLYHSV RETURN 9

## (undated) DEVICE — STAPLER SKIN ROTATING HEAD

## (undated) DEVICE — SUT JJ41G O VICRYL

## (undated) DEVICE — BLADE 4IN EDGE INSULATED

## (undated) DEVICE — SEE MEDLINE ITEM 156905

## (undated) DEVICE — GLOVE 7.5 PROTEXIS PI BLUE

## (undated) DEVICE — CLIPPER BLADE MOD 4406 (CAREF)

## (undated) DEVICE — SUT VICRYL 2 0 CT 2

## (undated) DEVICE — SEE MEDLINE ITEM 157116

## (undated) DEVICE — DRESSING TRANS 4X4 TEGADERM

## (undated) DEVICE — CLOSURE SKIN STERI STRIP 1/2X4

## (undated) DEVICE — SUPPORT ULNA NERVE PROTECTOR

## (undated) DEVICE — SEE MEDLINE ITEM 146313

## (undated) DEVICE — PACK BASIC

## (undated) DEVICE — DRESSING SURGICAL 3/4X3/4

## (undated) DEVICE — DRAPE STERI-DRAPE 1000 17X11IN

## (undated) DEVICE — DRESSING MEPILEX BORDER 4 X 4

## (undated) DEVICE — DRESSING SURGICAL 1/2X1/2

## (undated) DEVICE — KIT EVACUATOR 3-SPRING 1/8 DRN

## (undated) DEVICE — ADHESIVE MASTISOL VIAL 48/BX

## (undated) DEVICE — TAPE SILK 3IN

## (undated) DEVICE — APPLICATOR CHLORAPREP ORN 26ML

## (undated) DEVICE — DRAPE C-ARM/MOBILE XRAY 44X80

## (undated) DEVICE — SEE MEDLINE ITEM 157148

## (undated) DEVICE — TRAY FOLEY 16FR INFECTION CONT

## (undated) DEVICE — DRESSING AQUACEL FOAM 5 X 5

## (undated) DEVICE — BURR RND FLUT SFT TOUCH 2.0MM

## (undated) DEVICE — KIT POWDER ABSORBABLE GELATIN

## (undated) DEVICE — COVER OVERHEAD SURG LT BLUE

## (undated) DEVICE — CORD BIPOLAR 12 FOOT